# Patient Record
Sex: FEMALE | Race: WHITE | NOT HISPANIC OR LATINO | Employment: OTHER | URBAN - METROPOLITAN AREA
[De-identification: names, ages, dates, MRNs, and addresses within clinical notes are randomized per-mention and may not be internally consistent; named-entity substitution may affect disease eponyms.]

---

## 2021-07-12 ENCOUNTER — OFFICE VISIT (OUTPATIENT)
Dept: CARDIOLOGY | Facility: CLINIC | Age: 56
End: 2021-07-12

## 2021-07-12 VITALS
WEIGHT: 264 LBS | BODY MASS INDEX: 43.99 KG/M2 | HEART RATE: 67 BPM | DIASTOLIC BLOOD PRESSURE: 87 MMHG | SYSTOLIC BLOOD PRESSURE: 139 MMHG | HEIGHT: 65 IN

## 2021-07-12 DIAGNOSIS — R07.2 PRECORDIAL PAIN: Primary | ICD-10-CM

## 2021-07-12 DIAGNOSIS — E66.01 MORBID OBESITY WITH BMI OF 40.0-44.9, ADULT (HCC): ICD-10-CM

## 2021-07-12 DIAGNOSIS — R06.02 SHORTNESS OF BREATH: ICD-10-CM

## 2021-07-12 DIAGNOSIS — R94.31 ABNORMAL ELECTROCARDIOGRAM: ICD-10-CM

## 2021-07-12 PROBLEM — Z34.90: Status: ACTIVE | Noted: 2021-07-12

## 2021-07-12 PROCEDURE — 93000 ELECTROCARDIOGRAM COMPLETE: CPT | Performed by: INTERNAL MEDICINE

## 2021-07-12 PROCEDURE — 99203 OFFICE O/P NEW LOW 30 MIN: CPT | Performed by: INTERNAL MEDICINE

## 2021-07-12 RX ORDER — ESCITALOPRAM OXALATE 10 MG/1
10 TABLET ORAL DAILY
COMMUNITY
Start: 2021-05-28 | End: 2023-02-27 | Stop reason: SDUPTHER

## 2021-07-12 RX ORDER — EZETIMIBE 10 MG/1
10 TABLET ORAL DAILY
COMMUNITY
Start: 2021-04-23

## 2021-07-12 RX ORDER — MECLIZINE HCL 12.5 MG/1
12.5-25 TABLET ORAL 2 TIMES DAILY PRN
COMMUNITY
Start: 2021-06-11 | End: 2022-12-10 | Stop reason: SDUPTHER

## 2021-07-12 RX ORDER — ASPIRIN 81 MG/1
81 TABLET ORAL DAILY
COMMUNITY
End: 2022-07-06

## 2021-07-12 RX ORDER — SACCHAROMYCES BOULARDII 250 MG
250 CAPSULE ORAL DAILY
COMMUNITY
Start: 2021-04-12 | End: 2022-07-06

## 2021-07-12 RX ORDER — FLUTICASONE PROPIONATE 50 MCG
2 SPRAY, SUSPENSION (ML) NASAL DAILY
COMMUNITY
Start: 2021-06-11 | End: 2022-07-06

## 2021-07-12 RX ORDER — CETIRIZINE HYDROCHLORIDE 10 MG/1
10 TABLET ORAL EVERY EVENING
Status: ON HOLD | COMMUNITY
Start: 2021-06-11 | End: 2023-03-30

## 2021-07-12 NOTE — PROGRESS NOTES
New Patient     Ref By Dr. Anedrson  Abnormal EKG  Chest discomfort and Dizziness    Subjective:        [facility]  Cardiology Consult Note    Patient Identification:  Name: Lizy Bryan  Age: 55 y.o.  Sex: female  :  1965  MRN: [id number]             CC  CP, ANYTIME, 30 SECONDS, SOB, FATIGUE,     H/O HTN  H/O HYPERLIPIDEMIA  PRE DIABETICS      History of Present Illness:   55-year-old female here for the cardiac evaluation as well as establishment of care as the patient complaining of the retrosternal chest pains come at anytime lasting for 30 seconds associated with the shortness of breath and fatigue    Patient also known to have a history of the hypertension hyperlipidemia and patient is prediabetic    Comorbid cardiac risk factors:     Past Medical History:  Past Medical History:   Diagnosis Date   • Abnormal ECG    • COPD (chronic obstructive pulmonary disease) (CMS/HCC)    • Hyperlipidemia    • Hypertension      Past Surgical History:  History reviewed. No pertinent surgical history.   Allergies:  No Known Allergies  Home Meds:  (Not in a hospital admission)    Current Meds:   [unfilled]  Social History:   Social History     Tobacco Use   • Smoking status: Former Smoker   • Smokeless tobacco: Never Used   • Tobacco comment: quit 2013   Substance Use Topics   • Alcohol use: Never      Family History:  Family History   Problem Relation Age of Onset   • Hypertension Mother    • Hyperlipidemia Mother    • Heart attack Mother    • Hypertension Father    • Hyperlipidemia Father    • Heart attack Father         Review of Systems    Constitutional: No weakness,fatigue, fever, rigors, chills   Eyes: No vision changes, eye pain   ENT/oropharynx: No difficulty swallowing, sore throat, epistaxis, changes in hearing   Cardiovascular:  Chest pain   Respiratory: No shortness of breath, dyspnea on exertion, cough, wheezing hemoptysis   Gastrointestinal: No abdominal pain, nausea, vomiting, diarrhea,  bloody stools   Genitourinary: No hematuria, dysuria   Neurological: No headache, tremors, numbness,  one-sided weakness    Musculoskeletal: No cramps, myalgias,  joint pain, joint swelling   Integument: No rash, edema           Constitutional:  Heart Rate:  [67] 67  BP: (139)/(87) 139/87    Physical Exam   General:  Appears in no acute distress  Eyes: PERTL,  HEENT:  No JVD. Thyroid not visibly enlarged. No mucosal pallor or cyanosis  Respiratory: Respirations regular and unlabored at rest. BBS with good air entry in all fields. No crackles, rubs or wheezes auscultated  Cardiovascular: S1S2 Regular rate and rhythm. No murmur, rub or gallop auscultated. No carotid bruits. DP/PT pulses    . No pretibial pitting edema  Gastrointestinal: Abdomen soft, flat, non tender. Bowel sounds present. No hepatosplenomegaly. No ascites  Musculoskeletal: METZGER x4. No abnormal movements  Extremities: No digital clubbing or cyanosis  Skin: Color pink. Skin warm and dry to touch. No rashes  No xanthoma  Neuro: AAO x3 CN II-XII grossly intact            ECG 12 Lead    Date/Time: 7/12/2021 11:08 AM  Performed by: Jesus Hassan MD  Authorized by: Jesus Hassan MD   Comparison: not compared with previous ECG   Previous ECG: no previous ECG available  Rhythm: sinus rhythm  ST Flattening: all    Clinical impression: non-specific ECG                Cardiographics  ECG:     Telemetry:    Echocardiogram:     Imaging  Chest X-ray:     Lab Review               @[email or web address]@              Assessment:/ Recommendations / Plan:   Patient Active Problem List   Diagnosis   • Morbid obesity with BMI of 40.0-44.9, adult (CMS/formerly [facility])   • Precordial pain   • Abnormal electrocardiogram                    ICD-10-CM ICD-9-CM   1. Precordial pain  R07.2 786.51   2. Morbid obesity with BMI of 40.0-44.9, adult (CMS/formerly [facility])  E66.01 278.01    Z68.41 V85.41   3. Abnormal electrocardiogram  R94.31 794.31     1.Shortness of Breath, Fatigue  Considering the  patient's symptoms as well as clinical situation and  EKG findings, along with cardiac risk factors, ischemic workup is necessary to rule out ischemic cardiomyopathy, stress induced arrhythmias, and functional capacity for diagnosis as well as prognostic consideration    - Stress Test With Myocardial Perfusion One Day  - Adult Transthoracic Echo Complete W/ Cont if Necessary Per Protocol  - Holter Monitor - 24 Hour    2. Precordial pain  Considering patient's medical condition as well as the risk factors, patient will require echocardiogram for further evaluation for the LV function, four-chamber evaluation, including the pressures, valvular function and  pericardial disease and pericardial effusion    - Stress Test With Myocardial Perfusion One Day  - Adult Transthoracic Echo Complete W/ Cont if Necessary Per Protocol  - Holter Monitor - 24 Hour    3. Morbid obesity with BMI of 40.0-44.9, adult (CMS/[facility])  Significant risk of obesity to CAD, HTN has been explained  Advantages of wt reduction has been explained    - Stress Test With Myocardial Perfusion One Day  - Adult Transthoracic Echo Complete W/ Cont if Necessary Per Protocol  - Holter Monitor - 24 Hour    4. Abnormal electrocardiogram    - Stress Test With Myocardial Perfusion One Day  - Adult Transthoracic Echo Complete W/ Cont if Necessary Per Protocol  - Holter Monitor - 24 Hour       WALKING MITRA, ECHO, 24 HR HOLTER  DOES NOT THINK SHE CAN DO WALKING STRESS TEST    Labs/tests ordered for am      Jesus Hassan MD  7/12/2021, 11:04 EDT      EMR Dragon/Transcription:   Dictated utilizing Dragon dictation

## 2022-07-05 PROCEDURE — 99285 EMERGENCY DEPT VISIT HI MDM: CPT

## 2022-07-05 RX ORDER — SODIUM CHLORIDE 0.9 % (FLUSH) 0.9 %
10 SYRINGE (ML) INJECTION AS NEEDED
Status: DISCONTINUED | OUTPATIENT
Start: 2022-07-05 | End: 2022-07-07 | Stop reason: HOSPADM

## 2022-07-06 ENCOUNTER — HOSPITAL ENCOUNTER (OUTPATIENT)
Facility: HOSPITAL | Age: 57
Setting detail: OBSERVATION
Discharge: HOME | End: 2022-07-07
Attending: EMERGENCY MEDICINE | Admitting: HOSPITALIST

## 2022-07-06 ENCOUNTER — APPOINTMENT (OUTPATIENT)
Dept: CT IMAGING | Facility: HOSPITAL | Age: 57
End: 2022-07-06

## 2022-07-06 DIAGNOSIS — N12 PYELONEPHRITIS: Primary | ICD-10-CM

## 2022-07-06 LAB
ALBUMIN SERPL-MCNC: 3.7 G/DL (ref 3.5–5.2)
ALBUMIN/GLOB SERPL: 1.2 G/DL
ALP SERPL-CCNC: 97 U/L (ref 39–117)
ALT SERPL W P-5'-P-CCNC: 32 U/L (ref 1–33)
ANION GAP SERPL CALCULATED.3IONS-SCNC: 13 MMOL/L (ref 5–15)
AST SERPL-CCNC: 28 U/L (ref 1–32)
BACTERIA UR QL AUTO: ABNORMAL /HPF
BASOPHILS # BLD AUTO: 0.05 10*3/MM3 (ref 0–0.2)
BASOPHILS NFR BLD AUTO: 1 % (ref 0–1.5)
BILIRUB SERPL-MCNC: 0.5 MG/DL (ref 0–1.2)
BILIRUB UR QL STRIP: NEGATIVE
BUN SERPL-MCNC: 9 MG/DL (ref 6–20)
BUN/CREAT SERPL: 8.8 (ref 7–25)
CALCIUM SPEC-SCNC: 8.9 MG/DL (ref 8.6–10.5)
CHLORIDE SERPL-SCNC: 101 MMOL/L (ref 98–107)
CLARITY UR: CLEAR
CO2 SERPL-SCNC: 23 MMOL/L (ref 22–29)
COLOR UR: YELLOW
CREAT SERPL-MCNC: 1.02 MG/DL (ref 0.57–1)
D-LACTATE SERPL-SCNC: 1 MMOL/L (ref 0.5–2)
D-LACTATE SERPL-SCNC: 2.2 MMOL/L (ref 0.5–2)
DEPRECATED RDW RBC AUTO: 41.5 FL (ref 37–54)
EGFRCR SERPLBLD CKD-EPI 2021: 64.7 ML/MIN/1.73
EOSINOPHIL # BLD AUTO: 0.16 10*3/MM3 (ref 0–0.4)
EOSINOPHIL NFR BLD AUTO: 3.1 % (ref 0.3–6.2)
ERYTHROCYTE [DISTWIDTH] IN BLOOD BY AUTOMATED COUNT: 13 % (ref 12.3–15.4)
GLOBULIN UR ELPH-MCNC: 3.2 GM/DL
GLUCOSE SERPL-MCNC: 86 MG/DL (ref 65–99)
GLUCOSE UR STRIP-MCNC: NEGATIVE MG/DL
HCT VFR BLD AUTO: 39.9 % (ref 34–46.6)
HGB BLD-MCNC: 14.3 G/DL (ref 12–15.9)
HGB UR QL STRIP.AUTO: ABNORMAL
HYALINE CASTS UR QL AUTO: ABNORMAL /LPF
IMM GRANULOCYTES # BLD AUTO: 0.03 10*3/MM3 (ref 0–0.05)
IMM GRANULOCYTES NFR BLD AUTO: 0.6 % (ref 0–0.5)
KETONES UR QL STRIP: NEGATIVE
LEUKOCYTE ESTERASE UR QL STRIP.AUTO: ABNORMAL
LYMPHOCYTES # BLD AUTO: 0.79 10*3/MM3 (ref 0.7–3.1)
LYMPHOCYTES NFR BLD AUTO: 15.1 % (ref 19.6–45.3)
MCH RBC QN AUTO: 32 PG (ref 26.6–33)
MCHC RBC AUTO-ENTMCNC: 35.8 G/DL (ref 31.5–35.7)
MCV RBC AUTO: 89.3 FL (ref 79–97)
MONOCYTES # BLD AUTO: 0.43 10*3/MM3 (ref 0.1–0.9)
MONOCYTES NFR BLD AUTO: 8.2 % (ref 5–12)
NEUTROPHILS NFR BLD AUTO: 3.76 10*3/MM3 (ref 1.7–7)
NEUTROPHILS NFR BLD AUTO: 72 % (ref 42.7–76)
NITRITE UR QL STRIP: NEGATIVE
NRBC BLD AUTO-RTO: 0 /100 WBC (ref 0–0.2)
PH UR STRIP.AUTO: 6.5 [PH] (ref 5–8)
PLATELET # BLD AUTO: 205 10*3/MM3 (ref 140–450)
PMV BLD AUTO: 10.7 FL (ref 6–12)
POTASSIUM SERPL-SCNC: 3.6 MMOL/L (ref 3.5–5.2)
PROCALCITONIN SERPL-MCNC: 1.23 NG/ML (ref 0–0.25)
PROT SERPL-MCNC: 6.9 G/DL (ref 6–8.5)
PROT UR QL STRIP: ABNORMAL
RBC # BLD AUTO: 4.47 10*6/MM3 (ref 3.77–5.28)
RBC # UR STRIP: ABNORMAL /HPF
REF LAB TEST METHOD: ABNORMAL
SARS-COV-2 RNA RESP QL NAA+PROBE: NOT DETECTED
SODIUM SERPL-SCNC: 137 MMOL/L (ref 136–145)
SP GR UR STRIP: 1.01 (ref 1–1.03)
SQUAMOUS #/AREA URNS HPF: ABNORMAL /HPF
UROBILINOGEN UR QL STRIP: ABNORMAL
WBC # UR STRIP: ABNORMAL /HPF
WBC NRBC COR # BLD: 5.22 10*3/MM3 (ref 3.4–10.8)

## 2022-07-06 PROCEDURE — 36415 COLL VENOUS BLD VENIPUNCTURE: CPT

## 2022-07-06 PROCEDURE — 96375 TX/PRO/DX INJ NEW DRUG ADDON: CPT

## 2022-07-06 PROCEDURE — 84145 PROCALCITONIN (PCT): CPT | Performed by: EMERGENCY MEDICINE

## 2022-07-06 PROCEDURE — 83605 ASSAY OF LACTIC ACID: CPT | Performed by: EMERGENCY MEDICINE

## 2022-07-06 PROCEDURE — 80053 COMPREHEN METABOLIC PANEL: CPT | Performed by: EMERGENCY MEDICINE

## 2022-07-06 PROCEDURE — 81001 URINALYSIS AUTO W/SCOPE: CPT | Performed by: EMERGENCY MEDICINE

## 2022-07-06 PROCEDURE — 74176 CT ABD & PELVIS W/O CONTRAST: CPT

## 2022-07-06 PROCEDURE — 99285 EMERGENCY DEPT VISIT HI MDM: CPT

## 2022-07-06 PROCEDURE — 87040 BLOOD CULTURE FOR BACTERIA: CPT | Performed by: EMERGENCY MEDICINE

## 2022-07-06 PROCEDURE — G0378 HOSPITAL OBSERVATION PER HR: HCPCS

## 2022-07-06 PROCEDURE — 25010000002 CEFEPIME PER 500 MG: Performed by: EMERGENCY MEDICINE

## 2022-07-06 PROCEDURE — 85025 COMPLETE CBC W/AUTO DIFF WBC: CPT | Performed by: EMERGENCY MEDICINE

## 2022-07-06 PROCEDURE — 96365 THER/PROPH/DIAG IV INF INIT: CPT

## 2022-07-06 PROCEDURE — 25010000002 ONDANSETRON PER 1 MG: Performed by: EMERGENCY MEDICINE

## 2022-07-06 PROCEDURE — 25010000002 CEFTRIAXONE PER 250 MG: Performed by: HOSPITALIST

## 2022-07-06 PROCEDURE — U0003 INFECTIOUS AGENT DETECTION BY NUCLEIC ACID (DNA OR RNA); SEVERE ACUTE RESPIRATORY SYNDROME CORONAVIRUS 2 (SARS-COV-2) (CORONAVIRUS DISEASE [COVID-19]), AMPLIFIED PROBE TECHNIQUE, MAKING USE OF HIGH THROUGHPUT TECHNOLOGIES AS DESCRIBED BY CMS-2020-01-R: HCPCS | Performed by: EMERGENCY MEDICINE

## 2022-07-06 PROCEDURE — 25010000002 SODIUM CHLORIDE 0.9 % WITH KCL 20 MEQ 20-0.9 MEQ/L-% SOLUTION: Performed by: HOSPITALIST

## 2022-07-06 PROCEDURE — 25010000002 MORPHINE PER 10 MG: Performed by: EMERGENCY MEDICINE

## 2022-07-06 RX ORDER — ONDANSETRON 2 MG/ML
4 INJECTION INTRAMUSCULAR; INTRAVENOUS EVERY 6 HOURS PRN
Status: DISCONTINUED | OUTPATIENT
Start: 2022-07-06 | End: 2022-07-07

## 2022-07-06 RX ORDER — ONDANSETRON 2 MG/ML
4 INJECTION INTRAMUSCULAR; INTRAVENOUS ONCE
Status: COMPLETED | OUTPATIENT
Start: 2022-07-06 | End: 2022-07-06

## 2022-07-06 RX ORDER — HYDROMORPHONE HYDROCHLORIDE 1 MG/ML
0.5 INJECTION, SOLUTION INTRAMUSCULAR; INTRAVENOUS; SUBCUTANEOUS 4 TIMES DAILY PRN
Status: DISCONTINUED | OUTPATIENT
Start: 2022-07-06 | End: 2022-07-07

## 2022-07-06 RX ORDER — ACETAMINOPHEN 500 MG
1000 TABLET ORAL ONCE
Status: COMPLETED | OUTPATIENT
Start: 2022-07-06 | End: 2022-07-06

## 2022-07-06 RX ORDER — SPIRONOLACTONE 25 MG/1
50 TABLET ORAL DAILY
COMMUNITY
End: 2023-01-11 | Stop reason: SDUPTHER

## 2022-07-06 RX ORDER — CETIRIZINE HYDROCHLORIDE 10 MG/1
10 TABLET ORAL EVERY EVENING
Status: DISCONTINUED | OUTPATIENT
Start: 2022-07-06 | End: 2022-07-06

## 2022-07-06 RX ORDER — MORPHINE SULFATE 2 MG/ML
2 INJECTION, SOLUTION INTRAMUSCULAR; INTRAVENOUS ONCE
Status: DISCONTINUED | OUTPATIENT
Start: 2022-07-06 | End: 2022-07-06

## 2022-07-06 RX ORDER — ALBUTEROL SULFATE 90 UG/1
2 AEROSOL, METERED RESPIRATORY (INHALATION) EVERY 4 HOURS PRN
COMMUNITY
End: 2022-12-10 | Stop reason: SDUPTHER

## 2022-07-06 RX ORDER — TRAZODONE HYDROCHLORIDE 150 MG/1
150 TABLET ORAL NIGHTLY
COMMUNITY

## 2022-07-06 RX ORDER — CETIRIZINE HYDROCHLORIDE 10 MG/1
10 TABLET ORAL NIGHTLY
Status: DISCONTINUED | OUTPATIENT
Start: 2022-07-06 | End: 2022-07-07 | Stop reason: HOSPADM

## 2022-07-06 RX ORDER — SODIUM CHLORIDE AND POTASSIUM CHLORIDE 150; 900 MG/100ML; MG/100ML
75 INJECTION, SOLUTION INTRAVENOUS CONTINUOUS
Status: DISCONTINUED | OUTPATIENT
Start: 2022-07-06 | End: 2022-07-07

## 2022-07-06 RX ORDER — ALBUTEROL SULFATE 2.5 MG/3ML
2.5 SOLUTION RESPIRATORY (INHALATION) EVERY 4 HOURS PRN
Status: DISCONTINUED | OUTPATIENT
Start: 2022-07-06 | End: 2022-07-07

## 2022-07-06 RX ORDER — ESCITALOPRAM OXALATE 10 MG/1
10 TABLET ORAL DAILY
Status: DISCONTINUED | OUTPATIENT
Start: 2022-07-06 | End: 2022-07-07 | Stop reason: HOSPADM

## 2022-07-06 RX ORDER — MORPHINE SULFATE 2 MG/ML
2 INJECTION, SOLUTION INTRAMUSCULAR; INTRAVENOUS ONCE
Status: COMPLETED | OUTPATIENT
Start: 2022-07-06 | End: 2022-07-06

## 2022-07-06 RX ORDER — ONDANSETRON 2 MG/ML
4 INJECTION INTRAMUSCULAR; INTRAVENOUS ONCE
Status: DISCONTINUED | OUTPATIENT
Start: 2022-07-06 | End: 2022-07-06

## 2022-07-06 RX ORDER — PANTOPRAZOLE SODIUM 40 MG/1
40 TABLET, DELAYED RELEASE ORAL
Status: DISCONTINUED | OUTPATIENT
Start: 2022-07-07 | End: 2022-07-07 | Stop reason: HOSPADM

## 2022-07-06 RX ORDER — ACETAMINOPHEN 500 MG
1000 TABLET ORAL ONCE
Status: DISCONTINUED | OUTPATIENT
Start: 2022-07-06 | End: 2022-07-06

## 2022-07-06 RX ADMIN — MORPHINE SULFATE 2 MG: 2 INJECTION, SOLUTION INTRAMUSCULAR; INTRAVENOUS at 02:55

## 2022-07-06 RX ADMIN — ONDANSETRON 4 MG: 2 INJECTION INTRAMUSCULAR; INTRAVENOUS at 02:54

## 2022-07-06 RX ADMIN — CEFTRIAXONE 1 G: 1 INJECTION, POWDER, FOR SOLUTION INTRAMUSCULAR; INTRAVENOUS at 15:56

## 2022-07-06 RX ADMIN — SODIUM CHLORIDE 1000 ML: 9 INJECTION, SOLUTION INTRAVENOUS at 02:53

## 2022-07-06 RX ADMIN — TRAZODONE HYDROCHLORIDE 150 MG: 100 TABLET ORAL at 19:57

## 2022-07-06 RX ADMIN — CETIRIZINE HYDROCHLORIDE 10 MG: 10 TABLET ORAL at 19:58

## 2022-07-06 RX ADMIN — ACETAMINOPHEN 1000 MG: 500 TABLET ORAL at 08:55

## 2022-07-06 RX ADMIN — ESCITALOPRAM 10 MG: 10 TABLET, FILM COATED ORAL at 15:55

## 2022-07-06 RX ADMIN — SODIUM CHLORIDE AND POTASSIUM CHLORIDE 75 ML/HR: .9; .15 SOLUTION INTRAVENOUS at 13:49

## 2022-07-06 RX ADMIN — CEFEPIME HYDROCHLORIDE 2 G: 2 INJECTION, POWDER, FOR SOLUTION INTRAVENOUS at 02:58

## 2022-07-06 NOTE — PLAN OF CARE
Goal Outcome Evaluation:  VSS, pt feels like she may have been febrile today but only recorded temps in 99 range.  RA and CPAP at night, pt has not had any abdominal or flank pain this shift. Ambulating to bathroom with standby assist. IVF in PIV. Pt concerned about 2-3+ pitting edema in lower extremities, MD made aware of edema and home aldactone and attending MD suggested patient elevate legs. CTM.

## 2022-07-06 NOTE — PROGRESS NOTES
Clinical Pharmacy Services: Medication History    Lizy Bryan is a 56 y.o. female presenting to [facility] for   Chief Complaint   Patient presents with   • Urinary Tract Infection       She  has a past medical history of Abnormal ECG, COPD (chronic obstructive pulmonary disease) (CMS/[facility]), Hyperlipidemia, and Hypertension.    Allergies as of 07/05/2022   • (No Known Allergies)       Medication information was obtained from: Patient   Pharmacy and Phone Number:     Prior to Admission Medications     Prescriptions Last Dose Informant Patient Reported? Taking?    albuterol sulfate  (90 Base) MCG/ACT inhaler 7/6/2022 Self Yes Yes    Inhale 2 puffs Every 4 (Four) Hours As Needed for Wheezing.    escitalopram (LEXAPRO) 10 MG tablet Past Week Self Yes Yes    Take 10 mg by mouth Daily.    ezetimibe (ZETIA) 10 MG tablet Past Week Self Yes Yes    Take 10 mg by mouth Daily.    spironolactone (ALDACTONE) 25 MG tablet Past Week Self Yes Yes    Take 25 mg by mouth Daily.    traZODone (DESYREL) 150 MG tablet Past Week Self Yes Yes    Take 150 mg by mouth Every Night.    cetirizine (zyrTEC) 10 MG tablet More than a month Self Yes No    Take 10 mg by mouth Every Evening.    meclizine (ANTIVERT) 12.5 MG tablet More than a month Self Yes No    Take 12.5-25 mg by mouth 2 (Two) Times a Day As Needed.            Medication notes: Patient states she is prescribed lisinopril 10 mg that she takes daily, however she has had low blood pressure since becoming sick and was advised by her doctor to hold off on taking lisinopril until she is better.    This medication list is complete to the best of my knowledge as of 7/6/2022    Please call if questions.    Kevin Garcia  Medication History Technician  [phone]    7/6/2022 09:21 EDT

## 2022-07-06 NOTE — ED PROVIDER NOTES
EMERGENCY DEPARTMENT ENCOUNTER    Room Number:  16/16  Date of encounter:  7/6/2022  PCP: Larissa Anderson MD  Historian: Patient      HPI:  Chief Complaint: Right flank pain, fevers chills  A complete HPI/ROS/PMH/PSH/SH/FH are unobtainable due to: None    Context: Lizy Bryan is a 56 y.o. female who presents to the ED c/o left flank pain fevers chills for the past several days.  Patient states she recently was diagnosed with 3 mm kidney stone which she felt passed.  Patient states that since then she has been having some intermittent chills and nausea.  Was seen in the ED and apparently released but then told she should come back for admission.  Patient reports occasional fevers and chills.  Also reports nausea vomiting.      PAST MEDICAL HISTORY  Active Ambulatory Problems     Diagnosis Date Noted   • Morbid obesity with BMI of 40.0-44.9, adult ([facility]) 07/12/2021   • Precordial pain 07/12/2021   • Abnormal electrocardiogram 07/12/2021     Resolved Ambulatory Problems     Diagnosis Date Noted   • No Resolved Ambulatory Problems     Past Medical History:   Diagnosis Date   • Abnormal ECG    • COPD (chronic obstructive pulmonary disease) (CMS/[facility])    • Hyperlipidemia    • Hypertension          PAST SURGICAL HISTORY  No past surgical history on file.      FAMILY HISTORY  Family History   Problem Relation Age of Onset   • Hypertension Mother    • Hyperlipidemia Mother    • Heart attack Mother    • Hypertension Father    • Hyperlipidemia Father    • Heart attack Father          SOCIAL HISTORY  Social History     Socioeconomic History   • Marital status:    Tobacco Use   • Smoking status: Former Smoker   • Smokeless tobacco: Never Used   • Tobacco comment: quit 2013   Vaping Use   • Vaping Use: Every day   • Substances: Nicotine   • Devices: Pre-filled or refillable cartridge   • Passive vaping exposure: Yes   Substance and Sexual Activity   • Alcohol use: Never   • Drug use: Never   • Sexual activity: Not  Currently         ALLERGIES  Patient has no known allergies.        REVIEW OF SYSTEMS  Review of Systems     All systems reviewed and negative except for those discussed in HPI.       PHYSICAL EXAM    I have reviewed the triage vital signs and nursing notes.    ED Triage Vitals [07/05/22 2121]   Temp Heart Rate Resp BP SpO2   97.9 °F (36.6 °C) 85 18 130/81 97 %      Temp src Heart Rate Source Patient Position BP Location FiO2 (%)   Tympanic Monitor Standing Left arm --       Physical Exam  GENERAL: Heart distressed  HENT: nares patent  EYES: no scleral icterus  CV: regular rhythm, regular rate  RESPIRATORY: normal effort clear to station bilaterally  ABDOMEN: soft, left flank tenderness  MUSCULOSKELETAL: no deformity  NEURO: alert, moves all extremities, follows commands  SKIN: warm, dry        LAB RESULTS  Recent Results (from the past 24 hour(s))   Comprehensive Metabolic Panel    Collection Time: 07/06/22  2:49 AM    Specimen: Blood   Result Value Ref Range    Glucose 86 65 - 99 mg/dL    BUN 9 6 - 20 mg/dL    Creatinine 1.02 (H) 0.57 - 1.00 mg/dL    Sodium 137 136 - 145 mmol/L    Potassium 3.6 3.5 - 5.2 mmol/L    Chloride 101 98 - 107 mmol/L    CO2 23.0 22.0 - 29.0 mmol/L    Calcium 8.9 8.6 - 10.5 mg/dL    Total Protein 6.9 6.0 - 8.5 g/dL    Albumin 3.70 3.50 - 5.20 g/dL    ALT (SGPT) 32 1 - 33 U/L    AST (SGOT) 28 1 - 32 U/L    Alkaline Phosphatase 97 39 - 117 U/L    Total Bilirubin 0.5 0.0 - 1.2 mg/dL    Globulin 3.2 gm/dL    A/G Ratio 1.2 g/dL    BUN/Creatinine Ratio 8.8 7.0 - 25.0    Anion Gap 13.0 5.0 - 15.0 mmol/L    eGFR 64.7 >60.0 mL/min/1.73   Procalcitonin    Collection Time: 07/06/22  2:49 AM    Specimen: Blood   Result Value Ref Range    Procalcitonin 1.23 (H) 0.00 - 0.25 ng/mL   Lactic Acid, Plasma    Collection Time: 07/06/22  2:49 AM    Specimen: Blood   Result Value Ref Range    Lactate 2.2 (C) 0.5 - 2.0 mmol/L   CBC Auto Differential    Collection Time: 07/06/22  2:49 AM    Specimen: Blood    Result Value Ref Range    WBC 5.22 3.40 - 10.80 10*3/mm3    RBC 4.47 3.77 - 5.28 10*6/mm3    Hemoglobin 14.3 12.0 - 15.9 g/dL    Hematocrit 39.9 34.0 - 46.6 %    MCV 89.3 79.0 - 97.0 fL    MCH 32.0 26.6 - 33.0 pg    MCHC 35.8 (H) 31.5 - 35.7 g/dL    RDW 13.0 12.3 - 15.4 %    RDW-SD 41.5 37.0 - 54.0 fl    MPV 10.7 6.0 - 12.0 fL    Platelets 205 140 - 450 10*3/mm3    Neutrophil % 72.0 42.7 - 76.0 %    Lymphocyte % 15.1 (L) 19.6 - 45.3 %    Monocyte % 8.2 5.0 - 12.0 %    Eosinophil % 3.1 0.3 - 6.2 %    Basophil % 1.0 0.0 - 1.5 %    Immature Grans % 0.6 (H) 0.0 - 0.5 %    Neutrophils, Absolute 3.76 1.70 - 7.00 10*3/mm3    Lymphocytes, Absolute 0.79 0.70 - 3.10 10*3/mm3    Monocytes, Absolute 0.43 0.10 - 0.90 10*3/mm3    Eosinophils, Absolute 0.16 0.00 - 0.40 10*3/mm3    Basophils, Absolute 0.05 0.00 - 0.20 10*3/mm3    Immature Grans, Absolute 0.03 0.00 - 0.05 10*3/mm3    nRBC 0.0 0.0 - 0.2 /100 WBC   COVID-19, ROBERT IN-HOUSE CEPHEID/NATHAN NP SWAB IN TRANSPORT MEDIA 8-12 HR TAT - Swab, Nasopharynx    Collection Time: 07/06/22  3:27 AM    Specimen: Nasopharynx; Swab   Result Value Ref Range    COVID19 Not Detected Not Detected - Ref. Range   Urinalysis With Microscopic If Indicated (No Culture) - Urine, Clean Catch    Collection Time: 07/06/22  3:36 AM    Specimen: Urine, Clean Catch   Result Value Ref Range    Color, UA Yellow Yellow, Straw    Appearance, UA Clear Clear    pH, UA 6.5 5.0 - 8.0    Specific Gravity, UA 1.010 1.005 - 1.030    Glucose, UA Negative Negative    Ketones, UA Negative Negative    Bilirubin, UA Negative Negative    Blood, UA Trace (A) Negative    Protein, UA 30 mg/dL (1+) (A) Negative    Leuk Esterase, UA Trace (A) Negative    Nitrite, UA Negative Negative    Urobilinogen, UA 1.0 E.U./dL 0.2 - 1.0 E.U./dL   Urinalysis, Microscopic Only - Urine, Clean Catch    Collection Time: 07/06/22  3:36 AM    Specimen: Urine, Clean Catch   Result Value Ref Range    RBC, UA 3-5 (A) None Seen, 0-2 /HPF    WBC,  UA 3-5 (A) None Seen, 0-2 /HPF    Bacteria, UA None Seen None Seen /HPF    Squamous Epithelial Cells, UA 0-2 None Seen, 0-2 /HPF    Hyaline Casts, UA 0-2 None Seen /LPF    Methodology Automated Microscopy    STAT Lactic Acid, Reflex    Collection Time: 07/06/22  6:53 AM    Specimen: Blood   Result Value Ref Range    Lactate 1.0 0.5 - 2.0 mmol/L       Ordered the above labs and independently reviewed the results.        RADIOLOGY  CT Abdomen Pelvis Without Contrast    Result Date: 7/6/2022  Patient: YVON BAKER  Time Out: 05:39 Exam(s): CT ABDOMEN + PELVIS Without Contrast EXAM:   CT Abdomen and Pelvis Without Intravenous Contrast CLINICAL HISTORY:    Reason for exam: right flank pain, fevers, hx of kidney stone. TECHNIQUE:   Axial computed tomography images of the abdomen and pelvis without intravenous contrast.  CTDI is 34.5 mGy and DLP is 1708.5 mGy-cm.  This CT exam was performed according to the principle of ALARA (As Low As Reasonably Achievable) by using one or more of the following dose reduction techniques: automated exposure control, adjustment of the mA and or kV according to patient size, and or use of iterative reconstruction technique. COMPARISON:   None FINDINGS:   Lung bases:  Unremarkable.  No mass.  No consolidation.  ABDOMEN:   Liver:  Hepatomegaly.   Gallbladder and bile ducts:  Unremarkable.  No calcified stones.  No ductal dilation.   Pancreas:  Unremarkable.  No ductal dilation.   Spleen:  Borderline size of the spleen.   Adrenals:  Unremarkable.  No mass.   Kidneys and ureters:  Punctate nonobstructing right renal stones.  No hydronephrosis or definite ureteral stone.  Nonspecific left perinephric fat stranding.  Minimal fullness of the left renal collecting system.  Infection or recently passed stone would be difficult to exclude.   Stomach and bowel:  Evaluation of the stomach is limited by underdistention.  No small bowel obstruction or inflammation.  No mucosal thickening.  PELVIS:    Appendix:  Normal appendix.   Bladder:  Unremarkable.  No stones.   Reproductive:  Unremarkable as visualized.  ABDOMEN and PELVIS:   Intraperitoneal space:  Unremarkable.  No free air.  No significant fluid collection.   Bones joints:  Mild degenerative changes of the spine.  No acute fracture.  No dislocation.   Soft tissues:  Small fat-containing umbilical hernia.   Vasculature:  Atherosclerotic changes of the vasculature.  No aortic aneurysm.   Lymph nodes:  Unremarkable.  No enlarged lymph nodes. IMPRESSION:     1.  Punctate nonobstructing right renal stones.  No hydronephrosis or definite ureteral stone. 2.  Nonspecific left perinephric fat stranding.  Minimal fullness of the left renal collecting system.  Infection or recently passed stone would be difficult to exclude.     Electronically signed by Deon Green M.D. on 07-06-22 at 0539      I ordered the above noted radiological studies. Reviewed by me and discussed with radiologist.  See dictation for official radiology interpretation.      PROCEDURES    Procedures      MEDICATIONS GIVEN IN ER    Medications   sodium chloride 0.9 % flush 10 mL (has no administration in time range)   sodium chloride 0.9 % bolus 1,000 mL (0 mL Intravenous Stopped 7/6/22 0653)   cefepime 2 gm IVPB in 100 ml NS (VTB) (0 g Intravenous Stopped 7/6/22 0341)   morphine injection 2 mg (2 mg Intravenous Given 7/6/22 0255)   ondansetron (ZOFRAN) injection 4 mg (4 mg Intravenous Given 7/6/22 0254)         PROGRESS, DATA ANALYSIS, CONSULTS, AND MEDICAL DECISION MAKING    All labs have been independently reviewed by me.  All radiology studies have been reviewed by me and discussed with radiologist dictating the report.   EKG's independently viewed and interpreted by me.  Discussion below represents my analysis of pertinent findings related to patient's condition, differential diagnosis, treatment plan and final disposition.                 PPE: The patient wore a surgical mask throughout  the entire patient encounter. I wore an N95.    AS OF 07:47 EDT VITALS:    BP - 115/73  HR - 73  TEMP - 99.4 °F (37.4 °C) (Tympanic)  O2 SATS - 99%        DIAGNOSIS  Final diagnoses:   Pyelonephritis         DISPOSITION  Admit           Luís Munguia MD  07/06/22 0762

## 2022-07-06 NOTE — ED NOTES
"Pt ambulatory to triage from home - states seen at [facility] twice, told she needs to be admitted for iv antibiotics for a \"blood infection\" from her uti's that are not responding to bactrim.  Pt also states covid positive early June.  Pt wearing mask in triage. Triage personnel wore appropriate PPE    "

## 2022-07-06 NOTE — H&P
"History and physical    Primary care physician      Chief complaint  Fever chills  Left flank pain  Nausea    History of present illness  56-year-old white female with history of COPD morbidly obese hypertension hyperlipidemia and kidney stones in the past presented to [facility] emergency room with left flank pain fever chills nausea for last few days.  Patient has been evaluated and found to have 3 mm left ureter stone but no symptoms develop and she came back to the ER and work-up revealed acute left pyelonephritis with sepsis admit for management.  Patient apparently passed a kidney stone and feeling better.  Patient denies any chest pain vomiting diarrhea.    PAST MEDICAL HISTORY  • Morbidly obese     • COPD      • Hyperlipidemia     • Hypertension  History of kidney stones        PAST SURGICAL HISTORY     No past surgical history on file.     FAMILY HISTORY           Problem Relation Age of Onset   • Hypertension Mother     • Hyperlipidemia Mother     • Heart attack Mother     • Hypertension Father     • Hyperlipidemia Father     • Heart attack Father        SOCIAL HISTORY                Socioeconomic History   • Marital status:    Tobacco Use   • Smoking status: Former Smoker   • Smokeless tobacco: Never Used   • Tobacco comment: quit 2013   Vaping Use   • Vaping Use: Every day   • Substances: Nicotine   • Devices: Pre-filled or refillable cartridge   • Passive vaping exposure: Yes   Substance and Sexual Activity   • Alcohol use: Never   • Drug use: Never   • Sexual activity: Not Currently         ALLERGIES  Patient has no known allergies.  Home medications reviewed     REVIEW OF SYSTEMS  All systems reviewed and negative except for those discussed in HPI.      PHYSICAL EXAM  Blood pressure 120/70, pulse 78, temperature 97.9 °F (36.6 °C), temperature source Oral, resp. rate 20, height 162.6 cm (64\"), weight 113 kg (249 lb 12.8 oz), SpO2 97 %.    GENERAL: Heart distressed  HENT: melany " patent  EYES: no scleral icterus  CV: regular rhythm, regular rate  RESPIRATORY: normal effort clear to station bilaterally  ABDOMEN: soft, left flank tenderness nondistended bowel sounds positive  MUSCULOSKELETAL: no deformity  NEURO: alert, moves all extremities, follows commands  SKIN: warm, dry     LAB RESULTS  Lab Results (last 24 hours)     Procedure Component Value Units Date/Time    STAT Lactic Acid, Reflex [[id number]]  (Normal) Collected: 07/06/22 0653    Specimen: Blood Updated: 07/06/22 0732     Lactate 1.0 mmol/L     COVID PRE-OP / PRE-PROCEDURE SCREENING ORDER (NO ISOLATION) - Swab, Nasopharynx [[id number]]  (Normal) Collected: 07/06/22 0327    Specimen: Swab from Nasopharynx Updated: 07/06/22 0412    Narrative:      The following orders were created for panel order COVID PRE-OP / PRE-PROCEDURE SCREENING ORDER (NO ISOLATION) - Swab, Nasopharynx.  Procedure                               Abnormality         Status                     ---------                               -----------         ------                     COVID-19,[facility]-HOUSE...[[id number]]  Normal              Final result                 Please view results for these tests on the individual orders.    COVID-19,[facility]HOU[facility] CEPHEID/NATHAN NP SWAB IN TRANSPORT MEDIA 8-12 HR TAT - Swab, Nasopharynx [[id number]]  (Normal) Collected: 07/06/22 0327    Specimen: Swab from Nasopharynx Updated: 07/06/22 0412     COVID19 Not Detected    Narrative:      Fact sheet for providers: https://www.fda.gov/media/413606/download     Fact sheet for patients: https://www.fda.gov/media/522120/download    Urinalysis With Microscopic If Indicated (No Culture) - Urine, Clean Catch [[id number]]  (Abnormal) Collected: 07/06/22 0336    Specimen: Urine, Clean Catch Updated: 07/06/22 0348     Color, UA Yellow     Appearance, UA Clear     pH, UA 6.5     Specific Gravity, UA 1.010     Glucose, UA Negative     Ketones, UA Negative     Bilirubin, UA Negative     Blood, UA  "Trace     Protein, UA 30 mg/dL (1+)     Leuk Esterase, UA Trace     Nitrite, UA Negative     Urobilinogen, UA 1.0 E.U./dL    Urinalysis, Microscopic Only - Urine, Clean Catch [[id number]]  (Abnormal) Collected: 07/06/22 0336    Specimen: Urine, Clean Catch Updated: 07/06/22 0348     RBC, UA 3-5 /HPF      WBC, UA 3-5 /HPF      Bacteria, UA None Seen /HPF      Squamous Epithelial Cells, UA 0-2 /HPF      Hyaline Casts, UA 0-2 /LPF      Methodology Automated Microscopy    Lactic Acid, Plasma [[id number]]  (Abnormal) Collected: 07/06/22 0249    Specimen: Blood Updated: 07/06/22 0334     Lactate 2.2 mmol/L     Procalcitonin [[id number]]  (Abnormal) Collected: 07/06/22 0249    Specimen: Blood Updated: 07/06/22 0327     Procalcitonin 1.23 ng/mL     Narrative:      As a Marker for Sepsis (Non-Neonates):    1. <0.5 ng/mL represents a low risk of severe sepsis and/or septic shock.  2. >2 ng/mL represents a high risk of severe sepsis and/or septic shock.    As a Marker for Lower Respiratory Tract Infections that require antibiotic therapy:    PCT on Admission    Antibiotic Therapy       6-12 Hrs later    >0.5                Strongly Recommended  >0.25 - <0.5        Recommended   0.1 - 0.25          Discouraged              Remeasure/reassess PCT  <0.1                Strongly Discouraged     Remeasure/reassess PCT    As 28 day mortality risk marker: \"Change in Procalcitonin Result\" (>80% or <=80%) if Day 0 (or Day 1) and Day 4 values are available. Refer to http://www.[facility]-pct-calculator.com    Change in PCT <=80%  A decrease of PCT levels below or equal to 80% defines a positive change in PCT test result representing a higher risk for 28-day all-cause mortality of patients diagnosed with severe sepsis for septic shock.    Change in PCT >80%  A decrease of PCT levels of more than 80% defines a negative change in PCT result representing a lower risk for 28-day all-cause mortality of patients diagnosed with severe sepsis or septic " shock.       Comprehensive Metabolic Panel [[id number]]  (Abnormal) Collected: 07/06/22 0249    Specimen: Blood Updated: 07/06/22 0320     Glucose 86 mg/dL      BUN 9 mg/dL      Creatinine 1.02 mg/dL      Sodium 137 mmol/L      Potassium 3.6 mmol/L      Comment: Slight hemolysis detected by analyzer. Results may be affected.        Chloride 101 mmol/L      CO2 23.0 mmol/L      Calcium 8.9 mg/dL      Total Protein 6.9 g/dL      Albumin 3.70 g/dL      ALT (SGPT) 32 U/L      AST (SGOT) 28 U/L      Alkaline Phosphatase 97 U/L      Total Bilirubin 0.5 mg/dL      Globulin 3.2 gm/dL      A/G Ratio 1.2 g/dL      BUN/Creatinine Ratio 8.8     Anion Gap 13.0 mmol/L      eGFR 64.7 mL/min/1.73      Comment: National Kidney Foundation and American Society of Nephrology (ASN) Task Force recommended calculation based on the Chronic Kidney Disease Epidemiology Collaboration (CKD-EPI) equation refit without adjustment for race.       Narrative:      GFR Normal >60  Chronic Kidney Disease <60  Kidney Failure <15      CBC & Differential [[id number]]  (Abnormal) Collected: 07/06/22 0249    Specimen: Blood Updated: 07/06/22 0306    Narrative:      The following orders were created for panel order CBC & Differential.  Procedure                               Abnormality         Status                     ---------                               -----------         ------                     CBC Auto Differential[[id number]]        Abnormal            Final result                 Please view results for these tests on the individual orders.    CBC Auto Differential [[id number]]  (Abnormal) Collected: 07/06/22 0249    Specimen: Blood Updated: 07/06/22 0306     WBC 5.22 10*3/mm3      RBC 4.47 10*6/mm3      Hemoglobin 14.3 g/dL      Hematocrit 39.9 %      MCV 89.3 fL      MCH 32.0 pg      MCHC 35.8 g/dL      RDW 13.0 %      RDW-SD 41.5 fl      MPV 10.7 fL      Platelets 205 10*3/mm3      Neutrophil % 72.0 %      Lymphocyte % 15.1 %      Monocyte %  8.2 %      Eosinophil % 3.1 %      Basophil % 1.0 %      Immature Grans % 0.6 %      Neutrophils, Absolute 3.76 10*3/mm3      Lymphocytes, Absolute 0.79 10*3/mm3      Monocytes, Absolute 0.43 10*3/mm3      Eosinophils, Absolute 0.16 10*3/mm3      Basophils, Absolute 0.05 10*3/mm3      Immature Grans, Absolute 0.03 10*3/mm3      nRBC 0.0 /100 WBC     Blood Culture - Blood, Arm, Left [[id number]] Collected: 07/06/22 0258    Specimen: Blood from Arm, Left Updated: 07/06/22 0305    Blood Culture - Blood, Arm, Left [[id number]] Collected: 07/06/22 0249    Specimen: Blood from Arm, Left Updated: 07/06/22 0258        Imaging Results (Last 24 Hours)     Procedure Component Value Units Date/Time    CT Abdomen Pelvis Without Contrast [[id number]] Collected: 07/06/22 0539     Updated: 07/06/22 0539    Narrative:        Patient: YVON BAKER  Time Out: 05:39  Exam(s): CT ABDOMEN + PELVIS Without Contrast     EXAM:    CT Abdomen and Pelvis Without Intravenous Contrast    CLINICAL HISTORY:     Reason for exam: right flank pain, fevers, hx of kidney stone.    TECHNIQUE:    Axial computed tomography images of the abdomen and pelvis without   intravenous contrast.  CTDI is 34.5 mGy and DLP is 1708.5 mGy-cm.  This   CT exam was performed according to the principle of ALARA (As Low As   Reasonably Achievable) by using one or more of the following dose   reduction techniques: automated exposure control, adjustment of the mA   and or kV according to patient size, and or use of iterative   reconstruction technique.    COMPARISON:    None    FINDINGS:    Lung bases:  Unremarkable.  No mass.  No consolidation.     ABDOMEN:    Liver:  Hepatomegaly.    Gallbladder and bile ducts:  Unremarkable.  No calcified stones.  No   ductal dilation.    Pancreas:  Unremarkable.  No ductal dilation.    Spleen:  Borderline size of the spleen.    Adrenals:  Unremarkable.  No mass.    Kidneys and ureters:  Punctate nonobstructing right renal stones.  No    hydronephrosis or definite ureteral stone.  Nonspecific left perinephric   fat stranding.  Minimal fullness of the left renal collecting system.    Infection or recently passed stone would be difficult to exclude.    Stomach and bowel:  Evaluation of the stomach is limited by   underdistention.  No small bowel obstruction or inflammation.  No mucosal   thickening.     PELVIS:    Appendix:  Normal appendix.    Bladder:  Unremarkable.  No stones.    Reproductive:  Unremarkable as visualized.     ABDOMEN and PELVIS:    Intraperitoneal space:  Unremarkable.  No free air.  No significant   fluid collection.    Bones joints:  Mild degenerative changes of the spine.  No acute   fracture.  No dislocation.    Soft tissues:  Small fat-containing umbilical hernia.    Vasculature:  Atherosclerotic changes of the vasculature.  No aortic   aneurysm.    Lymph nodes:  Unremarkable.  No enlarged lymph nodes.    IMPRESSION:       1.  Punctate nonobstructing right renal stones.  No hydronephrosis or   definite ureteral stone.  2.  Nonspecific left perinephric fat stranding.  Minimal fullness of the   left renal collecting system.  Infection or recently passed stone would   be difficult to exclude.      Impression:          Electronically signed by Deon Green M.D. on 07-06-22 at 0539          Current Facility-Administered Medications:   •  albuterol (PROVENTIL) nebulizer solution 0.083% 2.5 mg/3mL, 2.5 mg, Nebulization, Q4H PRN, Vikram Yan MD  •  cefTRIAXone (ROCEPHIN) 1 g in sodium chloride 0.9 % 100 mL IVPB-VTB, 1 g, Intravenous, Q24H, Vikram Yan MD  •  cetirizine (zyrTEC) tablet 10 mg, 10 mg, Oral, Q PM, Vikram Yan MD  •  escitalopram (LEXAPRO) tablet 10 mg, 10 mg, Oral, Daily, Vikram Yan MD  •  HYDROmorphone (DILAUDID) injection 0.5 mg, 0.5 mg, Intravenous, 4x Daily PRN, Vikram Yan MD  •  ondansetron (ZOFRAN) injection 4 mg, 4 mg, Intravenous, Q6H PRN, Vikram Yan MD  •  [START ON 7/7/2022] pantoprazole  (PROTONIX) EC tablet 40 mg, 40 mg, Oral, Q AM, Alejandra Yan MD  •  [COMPLETED] Insert peripheral IV, , , Once **AND** sodium chloride 0.9 % flush 10 mL, 10 mL, Intravenous, PRN, Luís Munguia MD  •  sodium chloride 0.9 % with KCl 20 mEq/L infusion, 75 mL/hr, Intravenous, Continuous, Alejandra Yan MD  •  traZODone (DESYREL) tablet 150 mg, 150 mg, Oral, Nightly, Alejandra Yan MD     ASSESSMENT  Acute left pyelonephritis with sepsis  Kidney stones  Morbidly obese  COPD  Depression  Gastroesophageal reflux disease    PLAN  Admit  IVF  IV antibiotics  Urology consult  Symptomatic treatment for pain nausea and fever  Adjust home medications  Stress ulcer DVT prophylaxis  Supportive care  Patient is full code  Discussed with family nursing staff  Follow closely further recommendation according to hospital course    ALEJANDRA YAN MD

## 2022-07-07 VITALS
DIASTOLIC BLOOD PRESSURE: 66 MMHG | HEART RATE: 60 BPM | OXYGEN SATURATION: 96 % | HEIGHT: 64 IN | RESPIRATION RATE: 20 BRPM | TEMPERATURE: 98.4 F | SYSTOLIC BLOOD PRESSURE: 124 MMHG | BODY MASS INDEX: 42.65 KG/M2 | WEIGHT: 249.8 LBS

## 2022-07-07 LAB
ALBUMIN SERPL-MCNC: 3.2 G/DL (ref 3.5–5.2)
ALBUMIN/GLOB SERPL: 1.1 G/DL
ALP SERPL-CCNC: 82 U/L (ref 39–117)
ALT SERPL W P-5'-P-CCNC: 31 U/L (ref 1–33)
ANION GAP SERPL CALCULATED.3IONS-SCNC: 10 MMOL/L (ref 5–15)
AST SERPL-CCNC: 20 U/L (ref 1–32)
BASOPHILS # BLD AUTO: 0.05 10*3/MM3 (ref 0–0.2)
BASOPHILS NFR BLD AUTO: 1 % (ref 0–1.5)
BILIRUB SERPL-MCNC: 0.4 MG/DL (ref 0–1.2)
BUN SERPL-MCNC: 8 MG/DL (ref 6–20)
BUN/CREAT SERPL: 9.5 (ref 7–25)
CALCIUM SPEC-SCNC: 8.7 MG/DL (ref 8.6–10.5)
CHLORIDE SERPL-SCNC: 106 MMOL/L (ref 98–107)
CHOLEST SERPL-MCNC: 115 MG/DL (ref 0–200)
CO2 SERPL-SCNC: 22 MMOL/L (ref 22–29)
CREAT SERPL-MCNC: 0.84 MG/DL (ref 0.57–1)
DEPRECATED RDW RBC AUTO: 45.3 FL (ref 37–54)
EGFRCR SERPLBLD CKD-EPI 2021: 81.7 ML/MIN/1.73
EOSINOPHIL # BLD AUTO: 0.17 10*3/MM3 (ref 0–0.4)
EOSINOPHIL NFR BLD AUTO: 3.4 % (ref 0.3–6.2)
ERYTHROCYTE [DISTWIDTH] IN BLOOD BY AUTOMATED COUNT: 13.3 % (ref 12.3–15.4)
GLOBULIN UR ELPH-MCNC: 2.9 GM/DL
GLUCOSE SERPL-MCNC: 119 MG/DL (ref 65–99)
HBA1C MFR BLD: 5.5 % (ref 4.8–5.6)
HCT VFR BLD AUTO: 37.6 % (ref 34–46.6)
HDLC SERPL-MCNC: 18 MG/DL (ref 40–60)
HGB BLD-MCNC: 12.5 G/DL (ref 12–15.9)
IMM GRANULOCYTES # BLD AUTO: 0.06 10*3/MM3 (ref 0–0.05)
IMM GRANULOCYTES NFR BLD AUTO: 1.2 % (ref 0–0.5)
LDLC SERPL CALC-MCNC: 65 MG/DL (ref 0–100)
LDLC/HDLC SERPL: 3.3 {RATIO}
LYMPHOCYTES # BLD AUTO: 1.12 10*3/MM3 (ref 0.7–3.1)
LYMPHOCYTES NFR BLD AUTO: 22.5 % (ref 19.6–45.3)
MCH RBC QN AUTO: 30.9 PG (ref 26.6–33)
MCHC RBC AUTO-ENTMCNC: 33.2 G/DL (ref 31.5–35.7)
MCV RBC AUTO: 92.8 FL (ref 79–97)
MONOCYTES # BLD AUTO: 0.67 10*3/MM3 (ref 0.1–0.9)
MONOCYTES NFR BLD AUTO: 13.5 % (ref 5–12)
NEUTROPHILS NFR BLD AUTO: 2.9 10*3/MM3 (ref 1.7–7)
NEUTROPHILS NFR BLD AUTO: 58.4 % (ref 42.7–76)
NRBC BLD AUTO-RTO: 0 /100 WBC (ref 0–0.2)
NT-PROBNP SERPL-MCNC: 998 PG/ML (ref 0–900)
PLATELET # BLD AUTO: 207 10*3/MM3 (ref 140–450)
PMV BLD AUTO: 10.4 FL (ref 6–12)
POTASSIUM SERPL-SCNC: 3.3 MMOL/L (ref 3.5–5.2)
PROT SERPL-MCNC: 6.1 G/DL (ref 6–8.5)
RBC # BLD AUTO: 4.05 10*6/MM3 (ref 3.77–5.28)
SODIUM SERPL-SCNC: 138 MMOL/L (ref 136–145)
TRIGL SERPL-MCNC: 188 MG/DL (ref 0–150)
TSH SERPL DL<=0.05 MIU/L-ACNC: 1.21 UIU/ML (ref 0.27–4.2)
VLDLC SERPL-MCNC: 32 MG/DL (ref 5–40)
WBC NRBC COR # BLD: 4.97 10*3/MM3 (ref 3.4–10.8)

## 2022-07-07 PROCEDURE — G0378 HOSPITAL OBSERVATION PER HR: HCPCS

## 2022-07-07 PROCEDURE — 83880 ASSAY OF NATRIURETIC PEPTIDE: CPT | Performed by: HOSPITALIST

## 2022-07-07 PROCEDURE — 83036 HEMOGLOBIN GLYCOSYLATED A1C: CPT | Performed by: HOSPITALIST

## 2022-07-07 PROCEDURE — 80061 LIPID PANEL: CPT | Performed by: HOSPITALIST

## 2022-07-07 PROCEDURE — 80050 GENERAL HEALTH PANEL: CPT | Performed by: HOSPITALIST

## 2022-07-07 RX ORDER — CEFDINIR 300 MG/1
300 CAPSULE ORAL EVERY 12 HOURS SCHEDULED
Status: DISCONTINUED | OUTPATIENT
Start: 2022-07-07 | End: 2022-07-07 | Stop reason: HOSPADM

## 2022-07-07 RX ORDER — PANTOPRAZOLE SODIUM 40 MG/1
40 TABLET, DELAYED RELEASE ORAL
Qty: 30 TABLET | Refills: 0 | Status: SHIPPED | OUTPATIENT
Start: 2022-07-08 | End: 2022-08-07

## 2022-07-07 RX ORDER — ACETAMINOPHEN 325 MG/1
650 TABLET ORAL EVERY 4 HOURS PRN
Status: DISCONTINUED | OUTPATIENT
Start: 2022-07-07 | End: 2022-07-07

## 2022-07-07 RX ORDER — CEFDINIR 300 MG/1
300 CAPSULE ORAL EVERY 12 HOURS SCHEDULED
Qty: 10 CAPSULE | Refills: 0 | Status: SHIPPED | OUTPATIENT
Start: 2022-07-07 | End: 2022-07-12

## 2022-07-07 RX ADMIN — ACETAMINOPHEN 650 MG: 325 TABLET ORAL at 08:59

## 2022-07-07 RX ADMIN — ACETAMINOPHEN 650 MG: 325 TABLET ORAL at 00:59

## 2022-07-07 RX ADMIN — PANTOPRAZOLE SODIUM 40 MG: 40 TABLET, DELAYED RELEASE ORAL at 05:28

## 2022-07-07 RX ADMIN — ESCITALOPRAM 10 MG: 10 TABLET, FILM COATED ORAL at 08:59

## 2022-07-07 NOTE — CONSULTS
FIRST UROLOGY CONSULT      Patient Identification:  NAME:  Lizy Bryan  Age:  56 y.o.   Sex:  female   :  1965   MRN:  [id number]       Chief complaint: Flank pain    History of present illness: 56-year-old female recently seen at [facility] emergency room with flank pain.  Found of a distal ureteral calculus millimeters left ureter.  She passed it the following day.  She went back with chills fevers nausea.  Felt to be consistent with pyelonephritis.  Told she had to be admitted for IV antibiotics.  Her pain is now resolved.  She is feeling better.  Nausea improved.  She has no attendant urologist.      Past medical history:  Past Medical History:   Diagnosis Date   • Abnormal ECG    • COPD (chronic obstructive pulmonary disease) (HCC)    • Hyperlipidemia    • Hypertension        Past surgical history:  No past surgical history on file.    Allergies:  Patient has no known allergies.    Home medications:  Medications Prior to Admission   Medication Sig Dispense Refill Last Dose   • albuterol sulfate  (90 Base) MCG/ACT inhaler Inhale 2 puffs Every 4 (Four) Hours As Needed for Wheezing.   2022 at Unknown time   • escitalopram (LEXAPRO) 10 MG tablet Take 10 mg by mouth Daily.   Past Week at Unknown time   • ezetimibe (ZETIA) 10 MG tablet Take 10 mg by mouth Daily.   Past Week at Unknown time   • spironolactone (ALDACTONE) 25 MG tablet Take 50 mg by mouth Daily.   Past Week at Unknown time   • traZODone (DESYREL) 150 MG tablet Take 150 mg by mouth Every Night.   Past Week at Unknown time   • cetirizine (zyrTEC) 10 MG tablet Take 10 mg by mouth Every Evening.   More than a month at Unknown time   • meclizine (ANTIVERT) 12.5 MG tablet Take 12.5-25 mg by mouth 2 (Two) Times a Day As Needed.   More than a month at Unknown time       Hospital medications:  cefTRIAXone, 1 g, Intravenous, Q24H  cetirizine, 10 mg, Oral, Nightly  escitalopram, 10 mg, Oral, Daily  [START ON 2022]  pantoprazole, 40 mg, Oral, Q AM  traZODone, 150 mg, Oral, Nightly      sodium chloride 0.9 % with KCl 20 mEq, 75 mL/hr, Last Rate: 75 mL/hr (22 1349)      •  albuterol  •  HYDROmorphone  •  ondansetron  •  [COMPLETED] Insert peripheral IV **AND** sodium chloride    Family history:  Family History   Problem Relation Age of Onset   • Hypertension Mother    • Hyperlipidemia Mother    • Heart attack Mother    • Hypertension Father    • Hyperlipidemia Father    • Heart attack Father        Social history:  Social History     Tobacco Use   • Smoking status: Former Smoker   • Smokeless tobacco: Never Used   • Tobacco comment: quit    Vaping Use   • Vaping Use: Every day   • Substances: Nicotine   • Devices: Pre-filled or refillable cartridge   • Passive vaping exposure: Yes   Substance Use Topics   • Alcohol use: Never   • Drug use: Never       Review of systems:    Negative 12-system ROS except for the following: As above no current irritable urinary symptoms      Objective:  TMax 24 hours:   Temp (24hrs), Av.3 °F (37.4 °C), Min:97.9 °F (36.6 °C), Max:102.3 °F (39.1 °C)      Vitals Ranges:   Temp:  [97.9 °F (36.6 °C)-102.3 °F (39.1 °C)] 98.4 °F (36.9 °C)  Heart Rate:  [71-87] 78  Resp:  [18-20] 18  BP: ()/(50-85) 135/76    Intake/Output Last 3 shifts:  I/O last 3 completed shifts:  In: 1460 [P.O.:360; IV Piggyback:1100]  Out: -      Physical Exam:       General Appearance:    Alert, cooperative, in no acute distress   Head:    Normocephalic, without obvious abnormality, atraumatic   Eyes:          PERRL, conjunctivae and corneas clear   Ears:    Normal external inspection   Throat:   No oral lesions, oral mucosa moist   Neck:   Supple, no LAD, trachea midline   Back:     No CVA tenderness   Lungs:     Respirations unlabored, symmetric excursion    Heart:    RRR, intact peripheral pulses   Abdomen:    Soft benign minimally tender left flank   :   Furred   Extremities:   No edema, no deformity   Skin:    No bleeding, bruising or rashes   Neuro/Psych:   Orientation intact, mood/affect pleasant, no focal findings       Results review:   I reviewed the patient's new clinical results.    Data review:  Lab Results (last 24 hours)     Procedure Component Value Units Date/Time    STAT Lactic Acid, Reflex [[id number]]  (Normal) Collected: 07/06/22 0653    Specimen: Blood Updated: 07/06/22 0732     Lactate 1.0 mmol/L     COVID PRE-OP / PRE-PROCEDURE SCREENING ORDER (NO ISOLATION) - Swab, Nasopharynx [[id number]]  (Normal) Collected: 07/06/22 0327    Specimen: Swab from Nasopharynx Updated: 07/06/22 0412    Narrative:      The following orders were created for panel order COVID PRE-OP / PRE-PROCEDURE SCREENING ORDER (NO ISOLATION) - Swab, Nasopharynx.  Procedure                               Abnormality         Status                     ---------                               -----------         ------                     COVID-19,[facility]-HOUSE...[[id number]]  Normal              Final result                 Please view results for these tests on the individual orders.    COVID-19,[facility] CEPHEID/NATHAN NP SWAB IN TRANSPORT MEDIA 8-12 HR TAT - Swab, Nasopharynx [[id number]]  (Normal) Collected: 07/06/22 0327    Specimen: Swab from Nasopharynx Updated: 07/06/22 0412     COVID19 Not Detected    Narrative:      Fact sheet for providers: https://www.fda.gov/media/496091/download     Fact sheet for patients: https://www.fda.gov/media/928874/download    Urinalysis With Microscopic If Indicated (No Culture) - Urine, Clean Catch [[id number]]  (Abnormal) Collected: 07/06/22 0336    Specimen: Urine, Clean Catch Updated: 07/06/22 0348     Color, UA Yellow     Appearance, UA Clear     pH, UA 6.5     Specific Gravity, UA 1.010     Glucose, UA Negative     Ketones, UA Negative     Bilirubin, UA Negative     Blood, UA Trace     Protein, UA 30 mg/dL (1+)     Leuk Esterase, UA Trace     Nitrite, UA Negative     Urobilinogen, UA 1.0  "E.U./dL    Urinalysis, Microscopic Only - Urine, Clean Catch [[id number]]  (Abnormal) Collected: 07/06/22 0336    Specimen: Urine, Clean Catch Updated: 07/06/22 0348     RBC, UA 3-5 /HPF      WBC, UA 3-5 /HPF      Bacteria, UA None Seen /HPF      Squamous Epithelial Cells, UA 0-2 /HPF      Hyaline Casts, UA 0-2 /LPF      Methodology Automated Microscopy    Lactic Acid, Plasma [[id number]]  (Abnormal) Collected: 07/06/22 0249    Specimen: Blood Updated: 07/06/22 0334     Lactate 2.2 mmol/L     Procalcitonin [[id number]]  (Abnormal) Collected: 07/06/22 0249    Specimen: Blood Updated: 07/06/22 0327     Procalcitonin 1.23 ng/mL     Narrative:      As a Marker for Sepsis (Non-Neonates):    1. <0.5 ng/mL represents a low risk of severe sepsis and/or septic shock.  2. >2 ng/mL represents a high risk of severe sepsis and/or septic shock.    As a Marker for Lower Respiratory Tract Infections that require antibiotic therapy:    PCT on Admission    Antibiotic Therapy       6-12 Hrs later    >0.5                Strongly Recommended  >0.25 - <0.5        Recommended   0.1 - 0.25          Discouraged              Remeasure/reassess PCT  <0.1                Strongly Discouraged     Remeasure/reassess PCT    As 28 day mortality risk marker: \"Change in Procalcitonin Result\" (>80% or <=80%) if Day 0 (or Day 1) and Day 4 values are available. Refer to http://www.[employer]-pct-calculator.com    Change in PCT <=80%  A decrease of PCT levels below or equal to 80% defines a positive change in PCT test result representing a higher risk for 28-day all-cause mortality of patients diagnosed with severe sepsis for septic shock.    Change in PCT >80%  A decrease of PCT levels of more than 80% defines a negative change in PCT result representing a lower risk for 28-day all-cause mortality of patients diagnosed with severe sepsis or septic shock.       Comprehensive Metabolic Panel [[id number]]  (Abnormal) Collected: 07/06/22 0249    Specimen: Blood " Updated: 07/06/22 0320     Glucose 86 mg/dL      BUN 9 mg/dL      Creatinine 1.02 mg/dL      Sodium 137 mmol/L      Potassium 3.6 mmol/L      Comment: Slight hemolysis detected by analyzer. Results may be affected.        Chloride 101 mmol/L      CO2 23.0 mmol/L      Calcium 8.9 mg/dL      Total Protein 6.9 g/dL      Albumin 3.70 g/dL      ALT (SGPT) 32 U/L      AST (SGOT) 28 U/L      Alkaline Phosphatase 97 U/L      Total Bilirubin 0.5 mg/dL      Globulin 3.2 gm/dL      A/G Ratio 1.2 g/dL      BUN/Creatinine Ratio 8.8     Anion Gap 13.0 mmol/L      eGFR 64.7 mL/min/1.73      Comment: National Kidney Foundation and American Society of Nephrology (ASN) Task Force recommended calculation based on the Chronic Kidney Disease Epidemiology Collaboration (CKD-EPI) equation refit without adjustment for race.       Narrative:      GFR Normal >60  Chronic Kidney Disease <60  Kidney Failure <15      CBC & Differential [[id number]]  (Abnormal) Collected: 07/06/22 0249    Specimen: Blood Updated: 07/06/22 0306    Narrative:      The following orders were created for panel order CBC & Differential.  Procedure                               Abnormality         Status                     ---------                               -----------         ------                     CBC Auto Differential[[id number]]        Abnormal            Final result                 Please view results for these tests on the individual orders.    CBC Auto Differential [[id number]]  (Abnormal) Collected: 07/06/22 0249    Specimen: Blood Updated: 07/06/22 0306     WBC 5.22 10*3/mm3      RBC 4.47 10*6/mm3      Hemoglobin 14.3 g/dL      Hematocrit 39.9 %      MCV 89.3 fL      MCH 32.0 pg      MCHC 35.8 g/dL      RDW 13.0 %      RDW-SD 41.5 fl      MPV 10.7 fL      Platelets 205 10*3/mm3      Neutrophil % 72.0 %      Lymphocyte % 15.1 %      Monocyte % 8.2 %      Eosinophil % 3.1 %      Basophil % 1.0 %      Immature Grans % 0.6 %      Neutrophils, Absolute 3.76  10*3/mm3      Lymphocytes, Absolute 0.79 10*3/mm3      Monocytes, Absolute 0.43 10*3/mm3      Eosinophils, Absolute 0.16 10*3/mm3      Basophils, Absolute 0.05 10*3/mm3      Immature Grans, Absolute 0.03 10*3/mm3      nRBC 0.0 /100 WBC     Blood Culture - Blood, Arm, Left [[id number]] Collected: 07/06/22 0258    Specimen: Blood from Arm, Left Updated: 07/06/22 0305    Blood Culture - Blood, Arm, Left [[id number]] Collected: 07/06/22 0249    Specimen: Blood from Arm, Left Updated: 07/06/22 0258           Imaging:  Imaging Results (Last 24 Hours)     Procedure Component Value Units Date/Time    CT Abdomen Pelvis Without Contrast [[id number]] Collected: 07/06/22 0539     Updated: 07/06/22 0539    Narrative:        Patient: YVON BAKER  Time Out: 05:39  Exam(s): CT ABDOMEN + PELVIS Without Contrast     EXAM:    CT Abdomen and Pelvis Without Intravenous Contrast    CLINICAL HISTORY:     Reason for exam: right flank pain, fevers, hx of kidney stone.    TECHNIQUE:    Axial computed tomography images of the abdomen and pelvis without   intravenous contrast.  CTDI is 34.5 mGy and DLP is 1708.5 mGy-cm.  This   CT exam was performed according to the principle of ALARA (As Low As   Reasonably Achievable) by using one or more of the following dose   reduction techniques: automated exposure control, adjustment of the mA   and or kV according to patient size, and or use of iterative   reconstruction technique.    COMPARISON:    None    FINDINGS:    Lung bases:  Unremarkable.  No mass.  No consolidation.     ABDOMEN:    Liver:  Hepatomegaly.    Gallbladder and bile ducts:  Unremarkable.  No calcified stones.  No   ductal dilation.    Pancreas:  Unremarkable.  No ductal dilation.    Spleen:  Borderline size of the spleen.    Adrenals:  Unremarkable.  No mass.    Kidneys and ureters:  Punctate nonobstructing right renal stones.  No   hydronephrosis or definite ureteral stone.  Nonspecific left perinephric   fat stranding.  Minimal  fullness of the left renal collecting system.    Infection or recently passed stone would be difficult to exclude.    Stomach and bowel:  Evaluation of the stomach is limited by   underdistention.  No small bowel obstruction or inflammation.  No mucosal   thickening.     PELVIS:    Appendix:  Normal appendix.    Bladder:  Unremarkable.  No stones.    Reproductive:  Unremarkable as visualized.     ABDOMEN and PELVIS:    Intraperitoneal space:  Unremarkable.  No free air.  No significant   fluid collection.    Bones joints:  Mild degenerative changes of the spine.  No acute   fracture.  No dislocation.    Soft tissues:  Small fat-containing umbilical hernia.    Vasculature:  Atherosclerotic changes of the vasculature.  No aortic   aneurysm.    Lymph nodes:  Unremarkable.  No enlarged lymph nodes.    IMPRESSION:       1.  Punctate nonobstructing right renal stones.  No hydronephrosis or   definite ureteral stone.  2.  Nonspecific left perinephric fat stranding.  Minimal fullness of the   left renal collecting system.  Infection or recently passed stone would   be difficult to exclude.      Impression:          Electronically signed by Deon Green M.D. on 07-06-22 at 0539             Assessment:       Pyelonephritis    Left pyelonephritis with recent stone passage.    Plan:     No imminent need for stent placement.  She is improving.  Hopefully she can be discharged here in the next 24 to 48 hours we can follow her up in the office for further follow-up of her nonobstructing right renal calculi.    Charanjit Romeo MD  07/06/22  21:51 EDT

## 2022-07-07 NOTE — PLAN OF CARE
Problem: Adult Inpatient Plan of Care  Goal: Plan of Care Review  Outcome: Adequate for Care Transition  Goal: Patient-Specific Goal (Individualized)  Outcome: Adequate for Care Transition  Goal: Absence of Hospital-Acquired Illness or Injury  Outcome: Adequate for Care Transition  Intervention: Identify and Manage Fall Risk  Description: Perform standard risk assessment on admission using a validated tool or comprehensive approach appropriate to the patient; reassess fall risk frequently, with change in status or transfer to another level of care.  Communicate fall injury risk to interprofessional healthcare team.  Determine need for increased observation, equipment and environmental modification, such as low bed, signage and supportive, nonskid footwear.  Adjust safety measures to individual developmental age, stage and identified risk factors.  Reinforce the importance of safety and physical activity with patient and family.  Perform regular intentional rounding to assess need for position change, pain assessment and personal needs, including assistance with toileting.  Recent Flowsheet Documentation  Taken 7/7/2022 9070 by Marry Jean-Baptiste RN  Safety Promotion/Fall Prevention:   safety round/check completed   room organization consistent   nonskid shoes/slippers when out of bed  Intervention: Prevent Skin Injury  Description: Perform a screening for skin injury risk, such as pressure or moisture associated skin damage on admission and at regular intervals throughout hospital stay.  Keep all areas of skin (especially folds) clean and dry.  Maintain adequate skin hydration.  Relieve and redistribute pressure and protect bony prominences; implement measures based on patient-specific risk factors.  Match turning and repositioning schedule to clinical condition.  Encourage weight shift frequently; assist with reposition if unable to complete independently.  Float heels off bed; avoid pressure on the Achilles  tendon.  Keep skin free from extended contact with medical devices.  Encourage functional activity and mobility, as early as tolerated.  Use aids (e.g., slide boards, mechanical lift) during transfer.  Recent Flowsheet Documentation  Taken 7/7/2022 0859 by Marry Jean-Baptiste RN  Body Position: position changed independently  Skin Protection:   adhesive use limited   incontinence pads utilized   tubing/devices free from skin contact  Intervention: Prevent and Manage VTE (Venous Thromboembolism) Risk  Description: Assess for VTE (venous thromboembolism) risk.  Encourage and assist with early ambulation.  Initiate and maintain compression or other therapy, as indicated, based on identified risk in accordance with organizational protocol and provider order.  Encourage both active and passive leg exercises while in bed, if unable to ambulate.  Recent Flowsheet Documentation  Taken 7/7/2022 0859 by Marry Jean-Baptiste RN  Activity Management:   activity adjusted per tolerance   up ad ab  VTE Prevention/Management:   bilateral   dorsiflexion/plantar flexion performed   patient refused intervention  Range of Motion: active ROM (range of motion) encouraged  Intervention: Prevent Infection  Description: Maintain skin and mucous membrane integrity; promote hand, oral and pulmonary hygiene.  Optimize fluid balance, nutrition, sleep and glycemic control to maximize infection resistance.  Identify potential sources of infection early to prevent or mitigate progression of infection (e.g., wound, lines, devices).  Evaluate ongoing need for invasive devices; remove promptly when no longer indicated.  Recent Flowsheet Documentation  Taken 7/7/2022 1000 by Marry Jean-Baptiste RN  Infection Prevention:   hand hygiene promoted   personal protective equipment utilized   single patient room provided   visitors restricted/screened  Taken 7/7/2022 0800 by Marry Jean-Baptiste RN  Infection Prevention:   hand hygiene promoted   personal  protective equipment utilized   single patient room provided   visitors restricted/screened  Goal: Optimal Comfort and Wellbeing  Outcome: Adequate for Care Transition  Intervention: Provide Person-Centered Care  Description: Use a family-focused approach to care.  Develop trust and rapport by proactively providing information, encouraging questions, addressing concerns and offering reassurance.  Acknowledge emotional response to hospitalization.  Recognize and utilize personal coping strategies.  Honor spiritual and cultural preferences.  Recent Flowsheet Documentation  Taken 7/7/2022 0859 by Marry Jean-Baptiste RN  Trust Relationship/Rapport:   care explained   choices provided   emotional support provided   empathic listening provided   questions answered   reassurance provided   questions encouraged   thoughts/feelings acknowledged  Goal: Readiness for Transition of Care  Outcome: Adequate for Care Transition   Goal Outcome Evaluation:

## 2022-07-07 NOTE — PLAN OF CARE
Goal Outcome Evaluation:  Plan of Care Reviewed With: patient        Progress: improving  Outcome Evaluation: pt is alert and oriented, room air, cpap while sleeping, no falls, up ad ab, c/o some SOA at times/swelling, IVF off, continue to monitor

## 2022-07-07 NOTE — DISCHARGE SUMMARY
Discharge summary    Date of admission 7/6/2022  Date of discharge 7/7/2022    Final diagnosis  Acute left pyelonephritis with sepsis  Kidney stones  Morbidly obese  COPD  Depression  Gastroesophageal reflux disease     Discharge medications  No current facility-administered medications for this encounter.    Current Outpatient Medications:   •  albuterol sulfate  (90 Base) MCG/ACT inhaler, Inhale 2 puffs Every 4 (Four) Hours As Needed for Wheezing., Disp: , Rfl:   •  cefdinir (OMNICEF) 300 MG capsule, Take 1 capsule by mouth Every 12 (Twelve) Hours for 10 doses. Indications: Urinary Tract Infection, Disp: 10 capsule, Rfl: 0  •  escitalopram (LEXAPRO) 10 MG tablet, Take 10 mg by mouth Daily., Disp: , Rfl:   •  ezetimibe (ZETIA) 10 MG tablet, Take 10 mg by mouth Daily., Disp: , Rfl:   •  [START ON 7/8/2022] pantoprazole (PROTONIX) 40 MG EC tablet, Take 1 tablet by mouth Every Morning for 30 days., Disp: 30 tablet, Rfl: 0  •  spironolactone (ALDACTONE) 25 MG tablet, Take 50 mg by mouth Daily., Disp: , Rfl:   •  traZODone (DESYREL) 150 MG tablet, Take 150 mg by mouth Every Night., Disp: , Rfl:   •  cetirizine (zyrTEC) 10 MG tablet, Take 10 mg by mouth Every Evening., Disp: , Rfl:   •  meclizine (ANTIVERT) 12.5 MG tablet, Take 12.5-25 mg by mouth 2 (Two) Times a Day As Needed., Disp: , Rfl:      Consults obtained  Urology    Procedures  None    Hospital course  56-year white female with history of COPD morbidly obese hypertension hyperlipidemia and kidney stones admitted through emergency room with left flank pain fever chills nausea.  Patient work-up in ER revealed acute left pyelonephritis admit for management.  Patient recently evaluated by urology and found to have 3 mm left ureteral stone which she already passed.  Patient responded to the treatment with IV fluid IV antibiotics and wants to go home.  Patient further evaluated by urology and recommend to discharge on oral antibiotics.  At the time of  discharge she is afebrile vital signs stable and all of her labs looks normal and her cultures still negative.  Patient okay to go home on oral antibiotics and follow-up with urology.    Discharge diet regular    Activity as tolerated    Medication as above    Follow-up with prime doctor in 1 week and follow-up with urology per the instruction and take medication as directed.    ALEJANDRA BAKER MD

## 2022-07-07 NOTE — PROGRESS NOTES
"Daily progress note    Chief complaint  Doing better  No new complaints  Wants to go home    History of present illness  56-year-old white female with history of COPD morbidly obese hypertension hyperlipidemia and kidney stones in the past presented to [facility] emergency room with left flank pain fever chills nausea for last few days.  Patient has been evaluated and found to have 3 mm left ureter stone but no symptoms develop and she came back to the ER and work-up revealed acute left pyelonephritis with sepsis admit for management.  Patient apparently passed a kidney stone and feeling better.  Patient denies any chest pain vomiting diarrhea.     REVIEW OF SYSTEMS  All systems reviewed and negative except for those discussed in HPI.      PHYSICAL EXAM  Blood pressure 124/66, pulse 60, temperature 98.4 °F (36.9 °C), temperature source Oral, resp. rate 20, height 162.6 cm (64\"), weight 113 kg (249 lb 12.8 oz), SpO2 96 %.    GENERAL: Heart distressed  HENT: nares patent  EYES: no scleral icterus  CV: regular rhythm, regular rate  RESPIRATORY: normal effort clear to station bilaterally  ABDOMEN: soft, left flank tenderness nondistended bowel sounds positive  MUSCULOSKELETAL: no deformity  NEURO: alert, moves all extremities, follows commands  SKIN: warm, dry     LAB RESULTS  Lab Results (last 24 hours)     Procedure Component Value Units Date/Time    BNP [[id number]]  (Abnormal) Collected: 07/07/22 0543    Specimen: Blood Updated: 07/07/22 0625     proBNP 998.0 pg/mL     Narrative:      Among patients with dyspnea, NT-proBNP is highly sensitive for the detection of acute congestive heart failure. In addition NT-proBNP of <300 pg/ml effectively rules out acute congestive heart failure with 99% negative predictive value.    Results may be falsely decreased if patient taking Biotin.      TSH [[id number]]  (Normal) Collected: 07/07/22 0543    Specimen: Blood Updated: 07/07/22 0625     TSH 1.210 uIU/mL     Lipid " Panel [[id number]]  (Abnormal) Collected: 07/07/22 0543    Specimen: Blood Updated: 07/07/22 0622     Total Cholesterol 115 mg/dL      Triglycerides 188 mg/dL      HDL Cholesterol 18 mg/dL      LDL Cholesterol  65 mg/dL      VLDL Cholesterol 32 mg/dL      LDL/HDL Ratio 3.30    Narrative:      Cholesterol Reference Ranges  (U.S. Department of Health and Human Services ATP III Classifications)    Desirable          <200 mg/dL  Borderline High    200-239 mg/dL  High Risk          >240 mg/dL      Triglyceride Reference Ranges  (U.S. Department of Health and Human Services ATP III Classifications)    Normal           <150 mg/dL  Borderline High  150-199 mg/dL  High             200-499 mg/dL  Very High        >500 mg/dL    HDL Reference Ranges  (U.S. Department of Health and Human Services ATP III Classifications)    Low     <40 mg/dl (major risk factor for CHD)  High    >60 mg/dl ('negative' risk factor for CHD)        LDL Reference Ranges  (U.S. Department of Health and Human Services ATP III Classifications)    Optimal          <100 mg/dL  Near Optimal     100-129 mg/dL  Borderline High  130-159 mg/dL  High             160-189 mg/dL  Very High        >189 mg/dL    Comprehensive Metabolic Panel [[id number]]  (Abnormal) Collected: 07/07/22 0543    Specimen: Blood Updated: 07/07/22 0622     Glucose 119 mg/dL      BUN 8 mg/dL      Creatinine 0.84 mg/dL      Sodium 138 mmol/L      Potassium 3.3 mmol/L      Chloride 106 mmol/L      CO2 22.0 mmol/L      Calcium 8.7 mg/dL      Total Protein 6.1 g/dL      Albumin 3.20 g/dL      ALT (SGPT) 31 U/L      AST (SGOT) 20 U/L      Alkaline Phosphatase 82 U/L      Total Bilirubin 0.4 mg/dL      Globulin 2.9 gm/dL      A/G Ratio 1.1 g/dL      BUN/Creatinine Ratio 9.5     Anion Gap 10.0 mmol/L      eGFR 81.7 mL/min/1.73      Comment: National Kidney Foundation and American Society of Nephrology (ASN) Task Force recommended calculation based on the Chronic Kidney Disease Epidemiology  Collaboration (CKD-EPI) equation refit without adjustment for race.       Narrative:      GFR Normal >60  Chronic Kidney Disease <60  Kidney Failure <15      Hemoglobin A1c [[id number]]  (Normal) Collected: 07/07/22 0543    Specimen: Blood Updated: 07/07/22 0610     Hemoglobin A1C 5.50 %     Narrative:      Hemoglobin A1C Ranges:    Increased Risk for Diabetes  5.7% to 6.4%  Diabetes                     >= 6.5%  Diabetic Goal                < 7.0%    CBC & Differential [[id number]]  (Abnormal) Collected: 07/07/22 0543    Specimen: Blood Updated: 07/07/22 0603    Narrative:      The following orders were created for panel order CBC & Differential.  Procedure                               Abnormality         Status                     ---------                               -----------         ------                     CBC Auto Differential[[id number]]        Abnormal            Final result                 Please view results for these tests on the individual orders.    CBC Auto Differential [[id number]]  (Abnormal) Collected: 07/07/22 0543    Specimen: Blood Updated: 07/07/22 0603     WBC 4.97 10*3/mm3      RBC 4.05 10*6/mm3      Hemoglobin 12.5 g/dL      Hematocrit 37.6 %      MCV 92.8 fL      MCH 30.9 pg      MCHC 33.2 g/dL      RDW 13.3 %      RDW-SD 45.3 fl      MPV 10.4 fL      Platelets 207 10*3/mm3      Neutrophil % 58.4 %      Lymphocyte % 22.5 %      Monocyte % 13.5 %      Eosinophil % 3.4 %      Basophil % 1.0 %      Immature Grans % 1.2 %      Neutrophils, Absolute 2.90 10*3/mm3      Lymphocytes, Absolute 1.12 10*3/mm3      Monocytes, Absolute 0.67 10*3/mm3      Eosinophils, Absolute 0.17 10*3/mm3      Basophils, Absolute 0.05 10*3/mm3      Immature Grans, Absolute 0.06 10*3/mm3      nRBC 0.0 /100 WBC     Blood Culture - Blood, Arm, Left [[id number]]  (Normal) Collected: 07/06/22 0258    Specimen: Blood from Arm, Left Updated: 07/07/22 0317     Blood Culture No growth at 24 hours    Blood Culture - Blood,  Arm, Left [[id number]]  (Normal) Collected: 07/06/22 0249    Specimen: Blood from Arm, Left Updated: 07/07/22 0302     Blood Culture No growth at 24 hours        Imaging Results (Last 24 Hours)     ** No results found for the last 24 hours. **          Current Facility-Administered Medications:   •  cefdinir (OMNICEF) capsule 300 mg, 300 mg, Oral, Q12H, Alejandra Yan MD  •  cetirizine (zyrTEC) tablet 10 mg, 10 mg, Oral, Nightly, Alejandra Yan MD, 10 mg at 07/06/22 1958  •  escitalopram (LEXAPRO) tablet 10 mg, 10 mg, Oral, Daily, Alejandra Yan MD, 10 mg at 07/07/22 0859  •  pantoprazole (PROTONIX) EC tablet 40 mg, 40 mg, Oral, Q AM, Alejandra Yan MD, 40 mg at 07/07/22 0528  •  [COMPLETED] Insert peripheral IV, , , Once **AND** sodium chloride 0.9 % flush 10 mL, 10 mL, Intravenous, PRN, Luís Munguia MD  •  traZODone (DESYREL) tablet 150 mg, 150 mg, Oral, Nightly, Alejandra Yan MD, 150 mg at 07/06/22 1957    ASSESSMENT  Acute left pyelonephritis with sepsis  Kidney stones  Morbidly obese  COPD  Depression  Gastroesophageal reflux disease    PLAN  Discharge home  Dischsrge summary dictated    ALEJANDRA YAN MD

## 2022-07-11 LAB
BACTERIA SPEC AEROBE CULT: NORMAL
BACTERIA SPEC AEROBE CULT: NORMAL

## 2022-10-03 ENCOUNTER — OFFICE VISIT (OUTPATIENT)
Dept: FAMILY MEDICINE CLINIC | Facility: CLINIC | Age: 57
End: 2022-10-03

## 2022-10-03 VITALS
HEIGHT: 63 IN | HEART RATE: 81 BPM | OXYGEN SATURATION: 97 % | BODY MASS INDEX: 44.4 KG/M2 | WEIGHT: 250.6 LBS | TEMPERATURE: 98 F | DIASTOLIC BLOOD PRESSURE: 82 MMHG | SYSTOLIC BLOOD PRESSURE: 122 MMHG

## 2022-10-03 DIAGNOSIS — E66.01 MORBID OBESITY WITH BMI OF 40.0-44.9, ADULT: ICD-10-CM

## 2022-10-03 DIAGNOSIS — E78.2 MIXED HYPERLIPIDEMIA: ICD-10-CM

## 2022-10-03 DIAGNOSIS — Z76.89 ENCOUNTER TO ESTABLISH CARE: Primary | ICD-10-CM

## 2022-10-03 DIAGNOSIS — J44.9 CHRONIC OBSTRUCTIVE PULMONARY DISEASE, UNSPECIFIED COPD TYPE: ICD-10-CM

## 2022-10-03 DIAGNOSIS — Z12.31 ENCOUNTER FOR SCREENING MAMMOGRAM FOR MALIGNANT NEOPLASM OF BREAST: ICD-10-CM

## 2022-10-03 DIAGNOSIS — Z76.89 ENCOUNTER FOR WEIGHT MANAGEMENT: ICD-10-CM

## 2022-10-03 PROBLEM — F41.9 ANXIETY: Status: ACTIVE | Noted: 2022-10-03

## 2022-10-03 PROBLEM — F41.0 PANIC ATTACK: Status: ACTIVE | Noted: 2022-10-03

## 2022-10-03 PROBLEM — K21.9 GASTROESOPHAGEAL REFLUX DISEASE: Status: ACTIVE | Noted: 2022-10-03

## 2022-10-03 PROBLEM — R07.9 CHEST PAIN: Status: ACTIVE | Noted: 2021-07-12

## 2022-10-03 PROBLEM — F32.A DEPRESSIVE DISORDER: Status: ACTIVE | Noted: 2022-10-03

## 2022-10-03 PROCEDURE — 99204 OFFICE O/P NEW MOD 45 MIN: CPT | Performed by: NURSE PRACTITIONER

## 2022-10-03 RX ORDER — CHOLECALCIFEROL (VITAMIN D3) 25 MCG
TABLET,CHEWABLE ORAL
COMMUNITY
Start: 2022-07-05 | End: 2022-12-10 | Stop reason: SDUPTHER

## 2022-10-03 RX ORDER — TIOTROPIUM BROMIDE INHALATION SPRAY 1.56 UG/1
SPRAY, METERED RESPIRATORY (INHALATION)
COMMUNITY
Start: 2022-09-08 | End: 2022-12-10 | Stop reason: SDUPTHER

## 2022-10-03 NOTE — PROGRESS NOTES
"Chief Complaint  Establish Care    Subjective        Lizy Bryan presents to [facility] PRIMARY CARE  History of Present Illness   56-year-old female presenting to \[facility]\"" care and discuss weight loss options, previous PCP with [facility].  Annual physical with fasting labs recently obtained in July.  With hyperlipidemia, takes Zetia 10 mg daily.  With anxiety/depression, controlled with Lexapro 10 mg daily.  With COPD, takes Spiriva 2 inhalations daily and albuterol as needed, also has obstructive sleep apnea, wears CPAP.  She reports having to quit work to care for sick spouse whom passed away in January, states made it difficult to exercise but has tried multiple diets with minimal success, she was also evaluated by bariatric surgery for gastric sleeve but was not a good candidate and did not want to explore gastric bypass at that time.  Her current BMI is 44.39, I believe she would be a good candidate for Saxenda along with diet regimen, exercise is limited related to COPD.  She has extensive family history of DM 2, her last A1c 5.50 on 7/6/2022.     Objective   Vital Signs:  /82 (BP Location: Left arm, Patient Position: Sitting, Cuff Size: Large Adult)   Pulse 81   Temp 98 °F (36.7 °C) (Infrared)   Ht 160 cm (63\")   Wt 114 kg (250 lb 9.6 oz)   SpO2 97%   BMI 44.39 kg/m²   Estimated body mass index is 44.39 kg/m² as calculated from the following:    Height as of this encounter: 160 cm (63\").    Weight as of this encounter: 114 kg (250 lb 9.6 oz).    Class 3 Severe Obesity (BMI >=40). Obesity-related health conditions include the following: obstructive sleep apnea, dyslipidemias and COPD. Obesity is unchanged. BMI is is above average; BMI management plan is completed. We discussed portion control and increasing exercise.      Physical Exam  Constitutional:       Appearance: She is obese.   Cardiovascular:      Rate and Rhythm: Normal rate.      Pulses: Normal pulses.     "  Heart sounds: Normal heart sounds.   Pulmonary:      Effort: Pulmonary effort is normal.      Breath sounds: Normal breath sounds.   Abdominal:      General: Bowel sounds are normal.      Palpations: Abdomen is soft.   Neurological:      General: No focal deficit present.      Mental Status: She is alert and oriented to person, place, and time.   Psychiatric:         Mood and Affect: Mood normal.         Behavior: Behavior normal.         Thought Content: Thought content normal.         Judgment: Judgment normal.        Result Review :                Assessment and Plan   Diagnoses and all orders for this visit:    1. Encounter to establish care (Primary)    2. Encounter for weight management  -     Liraglutide (SAXENDA) 18 MG/3ML injection pen; Inject 0.6mg under skin daily for week one, THEN 1.2mg daily for week two, THEN 1.8mg daily for week three, then 2.4mg daily for week four.  Dispense: 3 mL; Refill: 3    3. Morbid obesity with BMI of 40.0-44.9, adult (HCC)  -     Liraglutide (SAXENDA) 18 MG/3ML injection pen; Inject 0.6mg under skin daily for week one, THEN 1.2mg daily for week two, THEN 1.8mg daily for week three, then 2.4mg daily for week four.  Dispense: 3 mL; Refill: 3    4. Chronic obstructive pulmonary disease, unspecified COPD type (HCC)    5. Encounter for screening mammogram for malignant neoplasm of breast  -     Mammo screening digital tomosynthesis bilateral w CAD; Future    6. Mixed hyperlipidemia           I spent 30 minutes caring for Lizy on this date of service. This time includes time spent by me in the following activities:reviewing tests, obtaining and/or reviewing a separately obtained history, performing a medically appropriate examination and/or evaluation , counseling and educating the patient/family/caregiver, ordering medications, tests, or procedures, documenting information in the medical record and independently interpreting results and communicating that information with the  patient/family/caregiver  Follow Up   Return in about 1 month (around 11/3/2022) for Recheck.  Patient was given instructions and counseling regarding her condition or for health maintenance advice. Please see specific information pulled into the AVS if appropriate.     Continue current medication regimen, diet and exercise. Follow-up in one month to evaluate effectiveness and toleration of Saxenda.     Mask and goggles worn.

## 2022-10-05 ENCOUNTER — TELEPHONE (OUTPATIENT)
Dept: FAMILY MEDICINE CLINIC | Facility: CLINIC | Age: 57
End: 2022-10-05

## 2022-10-05 NOTE — TELEPHONE ENCOUNTER
Caller: Lizy Bryan    Relationship: Self    Best call back number: [phone]     What is the best time to reach you: ANYTIME     Who are you requesting to speak with (clinical staff, provider,  specific staff member): CLINICAL     What was the call regarding: PATIENT CALLING STATING THAT SAMMI SENT A PRIOR AUTH FORM FOR THE Liraglutide (SAXENDA) 18 MG/3ML injection pen SHE WOULD LIKE TO KNOW IF THIS HAS BEEN RECEIVED IT YET    Do you require a callback: YES

## 2022-11-02 ENCOUNTER — HOSPITAL ENCOUNTER (OUTPATIENT)
Dept: MAMMOGRAPHY | Facility: HOSPITAL | Age: 57
Discharge: HOME | End: 2022-11-02
Admitting: NURSE PRACTITIONER

## 2022-11-02 ENCOUNTER — APPOINTMENT (OUTPATIENT)
Dept: OTHER | Facility: HOSPITAL | Age: 57
End: 2022-11-02

## 2022-11-02 DIAGNOSIS — Z12.31 ENCOUNTER FOR SCREENING MAMMOGRAM FOR MALIGNANT NEOPLASM OF BREAST: ICD-10-CM

## 2022-11-02 DIAGNOSIS — R92.8 ABNORMAL SCREENING MAMMOGRAM: Primary | ICD-10-CM

## 2022-11-02 DIAGNOSIS — Z09 FOLLOW-UP EXAM: ICD-10-CM

## 2022-11-02 PROCEDURE — 77063 BREAST TOMOSYNTHESIS BI: CPT

## 2022-11-02 PROCEDURE — 77067 SCR MAMMO BI INCL CAD: CPT

## 2022-11-07 ENCOUNTER — OFFICE VISIT (OUTPATIENT)
Dept: FAMILY MEDICINE CLINIC | Facility: CLINIC | Age: 57
End: 2022-11-07

## 2022-11-07 VITALS
OXYGEN SATURATION: 98 % | HEIGHT: 63 IN | HEART RATE: 77 BPM | SYSTOLIC BLOOD PRESSURE: 132 MMHG | WEIGHT: 242 LBS | TEMPERATURE: 98.6 F | BODY MASS INDEX: 42.88 KG/M2 | DIASTOLIC BLOOD PRESSURE: 78 MMHG

## 2022-11-07 DIAGNOSIS — Z76.89 ENCOUNTER FOR WEIGHT MANAGEMENT: Primary | ICD-10-CM

## 2022-11-07 DIAGNOSIS — Z09 FOLLOW-UP EXAMINATION: ICD-10-CM

## 2022-11-07 DIAGNOSIS — E66.01 MORBID OBESITY WITH BMI OF 40.0-44.9, ADULT: ICD-10-CM

## 2022-11-07 PROCEDURE — 99213 OFFICE O/P EST LOW 20 MIN: CPT | Performed by: NURSE PRACTITIONER

## 2022-11-07 NOTE — PATIENT INSTRUCTIONS
Continue medication, diet and exercise regimen. Follow-up in one month and come fasting to appointment.

## 2022-11-07 NOTE — PROGRESS NOTES
"Chief Complaint  Follow-up (saxenda)    Subjective        Lizy Bryan presents to [facility] PRIMARY CARE  History of Present Illness   56-year-old female presenting for follow-up weight management.  She was started on Saxenda on 10/3/2022, her weight at that time was 250 pounds, today she is at 242 pounds with a total 8 pound weight loss, she is currently taking Saxenda 2.4 mg, reports is tolerating medication well with no side effect's, states she did take 1 day of 3.0 mg and felt jittery, recommended resuming with 2.4 mg daily with follow-up in 1 month.  She continues with low calorie low-fat diet and exercise regimen.      Objective   Vital Signs:  /78 (BP Location: Left arm, Patient Position: Sitting, Cuff Size: Large Adult)   Pulse 77   Temp 98.6 °F (37 °C) (Infrared)   Ht 160 cm (63\")   Wt 110 kg (242 lb)   SpO2 98%   BMI 42.87 kg/m²   Estimated body mass index is 42.87 kg/m² as calculated from the following:    Height as of this encounter: 160 cm (63\").    Weight as of this encounter: 110 kg (242 lb).    Class 3 Severe Obesity (BMI >=40). Obesity-related health conditions include the following: hypertension and dyslipidemias. Obesity is improving with lifestyle modifications. BMI is is above average; BMI management plan is completed. We discussed portion control, increasing exercise and pharmacologic options including Saxenda.      Physical Exam  Cardiovascular:      Rate and Rhythm: Normal rate.      Pulses: Normal pulses.      Heart sounds: Normal heart sounds.   Pulmonary:      Effort: Pulmonary effort is normal.      Breath sounds: Normal breath sounds.   Abdominal:      General: Bowel sounds are normal.      Palpations: Abdomen is soft.   Neurological:      General: No focal deficit present.      Mental Status: She is alert and oriented to person, place, and time.   Psychiatric:         Mood and Affect: Mood normal.         Behavior: Behavior normal.         Thought " Content: Thought content normal.         Judgment: Judgment normal.        Result Review :                Assessment and Plan   Diagnoses and all orders for this visit:    1. Encounter for weight management (Primary)  Comments:  Continue Saxenda 2.4 mg daily, along with diet and exercise regimen.  Follow-up in 1 month.    2. Morbid obesity with BMI of 40.0-44.9, adult (HCC)  Comments:  Continue low calorie low-fat diet and exercise regimen with 30 minutes of cardio 3-4 times weekly.    3. Follow-up examination             Follow Up   Return in about 1 month (around 12/7/2022), or if symptoms worsen or fail to improve, for Recheck.  Patient was given instructions and counseling regarding her condition or for health maintenance advice. Please see specific information pulled into the AVS if appropriate.       Answers for HPI/ROS submitted by the patient on 10/31/2022  Please describe your symptoms.: Follow up for [place]  Have you had these symptoms before?: No  How long have you been having these symptoms?: Greater than 2 weeks  Please list any medications you are currently taking for this condition.: Sandexa  What is the primary reason for your visit?: Other

## 2022-11-30 ENCOUNTER — HOSPITAL ENCOUNTER (OUTPATIENT)
Dept: ULTRASOUND IMAGING | Facility: HOSPITAL | Age: 57
Discharge: HOME | End: 2022-11-30

## 2022-11-30 ENCOUNTER — HOSPITAL ENCOUNTER (OUTPATIENT)
Dept: MAMMOGRAPHY | Facility: HOSPITAL | Age: 57
Discharge: HOME | End: 2022-11-30

## 2022-11-30 DIAGNOSIS — R92.8 ABNORMAL SCREENING MAMMOGRAM: ICD-10-CM

## 2022-11-30 PROCEDURE — G0279 TOMOSYNTHESIS, MAMMO: HCPCS

## 2022-11-30 PROCEDURE — 76642 ULTRASOUND BREAST LIMITED: CPT

## 2022-11-30 PROCEDURE — 77066 DX MAMMO INCL CAD BI: CPT

## 2022-12-07 ENCOUNTER — OFFICE VISIT (OUTPATIENT)
Dept: FAMILY MEDICINE CLINIC | Facility: CLINIC | Age: 57
End: 2022-12-07

## 2022-12-07 VITALS
OXYGEN SATURATION: 98 % | WEIGHT: 241 LBS | SYSTOLIC BLOOD PRESSURE: 112 MMHG | HEART RATE: 79 BPM | HEIGHT: 63 IN | TEMPERATURE: 98.2 F | BODY MASS INDEX: 42.7 KG/M2 | DIASTOLIC BLOOD PRESSURE: 74 MMHG

## 2022-12-07 DIAGNOSIS — Z76.89 ENCOUNTER FOR WEIGHT MANAGEMENT: Primary | ICD-10-CM

## 2022-12-07 DIAGNOSIS — Z12.11 COLON CANCER SCREENING: ICD-10-CM

## 2022-12-07 DIAGNOSIS — M54.42 ACUTE MIDLINE LOW BACK PAIN WITH LEFT-SIDED SCIATICA: ICD-10-CM

## 2022-12-07 DIAGNOSIS — E55.9 VITAMIN D DEFICIENCY: ICD-10-CM

## 2022-12-07 DIAGNOSIS — Z11.59 ENCOUNTER FOR HEPATITIS C SCREENING TEST FOR LOW RISK PATIENT: ICD-10-CM

## 2022-12-07 DIAGNOSIS — E87.6 HYPOKALEMIA: ICD-10-CM

## 2022-12-07 DIAGNOSIS — M54.2 ACUTE NECK PAIN: ICD-10-CM

## 2022-12-07 DIAGNOSIS — E78.2 MIXED HYPERLIPIDEMIA: ICD-10-CM

## 2022-12-07 PROCEDURE — 99214 OFFICE O/P EST MOD 30 MIN: CPT | Performed by: NURSE PRACTITIONER

## 2022-12-07 NOTE — PROGRESS NOTES
"Chief Complaint  Follow-up (Weight loss) and Memory Loss    Subjective        Lizy Bryan presents to [facility] PRIMARY CARE  History of Present Illness   56-year-old female presenting for follow-up weight management, she was started on Saxenda on 10/3/2022, she has had a total 9 pound weight loss, will continue maintenance dose of 3 mg daily, educated on low calorie low-fat diet and exercise regimen.  She also has complaints of neck and low back pain with left sciatica, no imaging on file, will obtain x-ray imaging and place referral to physical therapy.  With hyperlipidemia, taking Zetia 10 mg daily, last lipid panel 6 months ago, will repeat.  With vitamin D deficiency, taking OTC vitamin D supplement, will repeat vitamin D level.  Her last CMP on 7/7/2022 showed hypokalemia, will repeat CMP.  She also agrees to colonoscopy, referral placed.    Objective   Vital Signs:  /74 (BP Location: Left arm, Patient Position: Sitting, Cuff Size: Large Adult)   Pulse 79   Temp 98.2 °F (36.8 °C) (Infrared)   Ht 160 cm (63\")   Wt 109 kg (241 lb)   SpO2 98%   BMI 42.69 kg/m²   Estimated body mass index is 42.69 kg/m² as calculated from the following:    Height as of this encounter: 160 cm (63\").    Weight as of this encounter: 109 kg (241 lb).    Class 3 Severe Obesity (BMI >=40). Obesity-related health conditions include the following: obstructive sleep apnea and dyslipidemias. Obesity is improving with treatment. BMI is is above average; BMI management plan is completed. We discussed portion control, increasing exercise and pharmacologic options including Saxenda.      Physical Exam  Constitutional:       Appearance: She is obese.   Cardiovascular:      Rate and Rhythm: Normal rate.      Pulses: Normal pulses.      Heart sounds: Normal heart sounds.   Pulmonary:      Effort: Pulmonary effort is normal.      Breath sounds: Normal breath sounds.   Abdominal:      General: Bowel sounds are " normal.      Palpations: Abdomen is soft.   Musculoskeletal:      Cervical back: Tenderness present. Decreased range of motion.      Lumbar back: Tenderness present.   Neurological:      General: No focal deficit present.      Mental Status: She is alert and oriented to person, place, and time.   Psychiatric:         Mood and Affect: Mood normal.         Behavior: Behavior normal.        Result Review :                Assessment and Plan   Diagnoses and all orders for this visit:    1. Encounter for weight management (Primary)  -     Liraglutide (SAXENDA) 18 MG/3ML injection pen; Inject 3 mg under the skin into the appropriate area as directed Daily.  Dispense: 15 mL; Refill: 5    2. Hypokalemia  -     Comprehensive metabolic panel; Future    3. Mixed hyperlipidemia  -     Lipid Panel; Future    4. Acute neck pain  -     Ambulatory Referral to Physical Therapy Evaluate and treat  -     XR Spine Cervical Complete 4 or 5 View; Future    5. Acute midline low back pain with left-sided sciatica  -     XR Spine Lumbar 4+ View; Future  -     Ambulatory Referral to Physical Therapy Evaluate and treat    6. Vitamin D deficiency  -     Vitamin D,25-Hydroxy; Future    7. Colon cancer screening  -     Ambulatory Referral For Screening Colonoscopy    8. Encounter for hepatitis C screening test for low risk patient  -     Hepatitis C antibody; Future           I spent 30 minutes caring for Lizy on this date of service. This time includes time spent by me in the following activities:reviewing tests, obtaining and/or reviewing a separately obtained history, performing a medically appropriate examination and/or evaluation , counseling and educating the patient/family/caregiver, ordering medications, tests, or procedures, referring and communicating with other health care professionals  and documenting information in the medical record  Follow Up   Return if symptoms worsen or fail to improve.  Patient was given instructions and  counseling regarding her condition or for health maintenance advice. Please see specific information pulled into the AVS if appropriate.     Continue Saxenda 3 ml daily, along with 1293-2130 calories a day and exercise regimen. Schedule an appointment for lab only and come fasting. Follow-up pending lab results.  Use of ice/heat therapy for neck and back pain along with stretching exercises.    Mask and gloves worn      Answers for HPI/ROS submitted by the patient on 11/30/2022  Please describe your symptoms.: Follow up for Saxenda and memory evaluation  Have you had these symptoms before?: Yes  How long have you been having these symptoms?: Greater than 2 weeks  What is the primary reason for your visit?: Other

## 2022-12-07 NOTE — PATIENT INSTRUCTIONS
Continue Saxenda 3 ml daily, along with 1727-3581 calories a day and exercise regimen. Schedule an appointment for lab only and come fasting. Follow-up pending lab results. Use of ice/heat therapy for neck and back pain along with stretching exercises.

## 2022-12-12 ENCOUNTER — LAB (OUTPATIENT)
Dept: FAMILY MEDICINE CLINIC | Facility: CLINIC | Age: 57
End: 2022-12-12

## 2022-12-12 DIAGNOSIS — E55.9 VITAMIN D DEFICIENCY: ICD-10-CM

## 2022-12-12 DIAGNOSIS — Z11.59 ENCOUNTER FOR HEPATITIS C SCREENING TEST FOR LOW RISK PATIENT: ICD-10-CM

## 2022-12-12 DIAGNOSIS — E78.2 MIXED HYPERLIPIDEMIA: ICD-10-CM

## 2022-12-12 DIAGNOSIS — E87.6 HYPOKALEMIA: Primary | ICD-10-CM

## 2022-12-12 RX ORDER — ALBUTEROL SULFATE 90 UG/1
2 AEROSOL, METERED RESPIRATORY (INHALATION) EVERY 4 HOURS PRN
Qty: 18 G | Refills: 4 | Status: SHIPPED | OUTPATIENT
Start: 2022-12-12

## 2022-12-12 RX ORDER — MECLIZINE HCL 12.5 MG/1
12.5-25 TABLET ORAL 2 TIMES DAILY PRN
Qty: 60 TABLET | Refills: 2 | Status: SHIPPED | OUTPATIENT
Start: 2022-12-12

## 2022-12-12 RX ORDER — CHOLECALCIFEROL (VITAMIN D3) 25 MCG
1 TABLET,CHEWABLE ORAL DAILY
Qty: 90 CAPSULE | Refills: 2 | Status: ON HOLD | OUTPATIENT
Start: 2022-12-12 | End: 2023-03-30

## 2022-12-12 RX ORDER — TIOTROPIUM BROMIDE INHALATION SPRAY 1.56 UG/1
2 SPRAY, METERED RESPIRATORY (INHALATION) DAILY PRN
Qty: 4 G | Refills: 4 | Status: SHIPPED | OUTPATIENT
Start: 2022-12-12

## 2022-12-13 LAB
25(OH)D3+25(OH)D2 SERPL-MCNC: 47.2 NG/ML (ref 30–100)
ALBUMIN SERPL-MCNC: 4.4 G/DL (ref 3.8–4.9)
ALBUMIN/GLOB SERPL: 1.6 {RATIO} (ref 1.2–2.2)
ALP SERPL-CCNC: 77 IU/L (ref 44–121)
ALT SERPL-CCNC: 22 IU/L (ref 0–32)
AST SERPL-CCNC: 21 IU/L (ref 0–40)
BILIRUB SERPL-MCNC: 0.6 MG/DL (ref 0–1.2)
BUN SERPL-MCNC: 11 MG/DL (ref 6–24)
BUN/CREAT SERPL: 11 (ref 9–23)
CALCIUM SERPL-MCNC: 10 MG/DL (ref 8.7–10.2)
CHLORIDE SERPL-SCNC: 100 MMOL/L (ref 96–106)
CHOLEST SERPL-MCNC: 205 MG/DL (ref 100–199)
CO2 SERPL-SCNC: 26 MMOL/L (ref 20–29)
CREAT SERPL-MCNC: 0.99 MG/DL (ref 0.57–1)
EGFRCR SERPLBLD CKD-EPI 2021: 67 ML/MIN/1.73
GLOBULIN SER CALC-MCNC: 2.7 G/DL (ref 1.5–4.5)
GLUCOSE SERPL-MCNC: 93 MG/DL (ref 70–99)
HCV AB S/CO SERPL IA: 0.1 S/CO RATIO (ref 0–0.9)
HDLC SERPL-MCNC: 54 MG/DL
LDLC SERPL CALC-MCNC: 121 MG/DL (ref 0–99)
POTASSIUM SERPL-SCNC: 4.2 MMOL/L (ref 3.5–5.2)
PROT SERPL-MCNC: 7.1 G/DL (ref 6–8.5)
SODIUM SERPL-SCNC: 140 MMOL/L (ref 134–144)
TRIGL SERPL-MCNC: 173 MG/DL (ref 0–149)
VLDLC SERPL CALC-MCNC: 30 MG/DL (ref 5–40)

## 2023-01-11 ENCOUNTER — OFFICE VISIT (OUTPATIENT)
Dept: FAMILY MEDICINE CLINIC | Facility: CLINIC | Age: 58
End: 2023-01-11
Payer: COMMERCIAL

## 2023-01-11 VITALS
OXYGEN SATURATION: 98 % | HEART RATE: 82 BPM | DIASTOLIC BLOOD PRESSURE: 76 MMHG | WEIGHT: 237.8 LBS | BODY MASS INDEX: 42.13 KG/M2 | SYSTOLIC BLOOD PRESSURE: 110 MMHG | HEIGHT: 63 IN | TEMPERATURE: 98.6 F

## 2023-01-11 DIAGNOSIS — Z76.89 ENCOUNTER FOR WEIGHT MANAGEMENT: Primary | ICD-10-CM

## 2023-01-11 DIAGNOSIS — F41.1 GAD (GENERALIZED ANXIETY DISORDER): ICD-10-CM

## 2023-01-11 DIAGNOSIS — R00.2 HEART PALPITATIONS: ICD-10-CM

## 2023-01-11 PROCEDURE — 93000 ELECTROCARDIOGRAM COMPLETE: CPT | Performed by: NURSE PRACTITIONER

## 2023-01-11 PROCEDURE — 99214 OFFICE O/P EST MOD 30 MIN: CPT | Performed by: NURSE PRACTITIONER

## 2023-01-11 RX ORDER — HYDROXYZINE HYDROCHLORIDE 10 MG/1
10 TABLET, FILM COATED ORAL 2 TIMES DAILY PRN
Qty: 30 TABLET | Refills: 1 | Status: SHIPPED | OUTPATIENT
Start: 2023-01-11

## 2023-01-11 RX ORDER — SPIRONOLACTONE AND HYDROCHLOROTHIAZIDE 25; 25 MG/1; MG/1
TABLET ORAL
COMMUNITY
Start: 2022-12-10

## 2023-01-11 NOTE — PROGRESS NOTES
"Chief Complaint  Weight Check    Subjective        Lizy Bryan presents to [facility]  History of Present Illness   57-year-old female presenting for follow-up weight management, she is on Saxenda 3 mg daily, she is tolerating medication well and continues with low calorie diet,  since her last visit, she is limited on exercising related to chronic neck and back pain, since her last visit on 12/7/2022 she has had a 4 pound weight loss, with overall weight loss of 13 pounds since starting Saxenda on 10/3/2022.  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  Her symptoms may be related to anxiety but we will go ahead and obtain EKG, if normal will order Holter monitor and place referral to cardiology.  I will also start hydroxyzine 10 mg twice daily as needed anxiety.      Objective   Vital Signs:  /76 (BP Location: Left arm, Patient Position: Sitting, Cuff Size: Large Adult)   Pulse 82   Temp 98.6 °F (37 °C) (Infrared)   Ht 160 cm (63\")   Wt 108 kg (237 lb 12.8 oz)   SpO2 98%   BMI 42.12 kg/m²   Estimated body mass index is 42.12 kg/m² as calculated from the following:    Height as of this encounter: 160 cm (63\").    Weight as of this encounter: 108 kg (237 lb 12.8 oz).       Class 3 Severe Obesity (BMI >=40). Obesity-related health conditions include the following: hypertension. Obesity is improving with treatment. BMI is is above average; BMI management plan is completed. We discussed portion control and increasing exercise.      Physical Exam  Cardiovascular:      Rate and Rhythm: Normal rate and regular rhythm.      Pulses: Normal pulses.      Heart sounds: Normal heart sounds.   Pulmonary:      Effort: Pulmonary effort is normal.      Breath sounds: Normal breath sounds. "   Abdominal:      General: Bowel sounds are normal.      Palpations: Abdomen is soft.   Neurological:      General: No focal deficit present.      Mental Status: She is alert and oriented to person, place, and time.   Psychiatric:         Mood and Affect: Mood normal.         Behavior: Behavior normal.         Thought Content: Thought content normal.         Judgment: Judgment normal.        Result Review :              ECG 12 Lead    Date/Time: 1/11/2023 8:46 AM  Performed by: Stefani Kuhn APRN  Authorized by: Stefani Kuhn APRN   Comparison: compared with previous ECG from 7/12/2021  Similar to previous ECG  Rhythm: sinus rhythm  Other findings: T wave abnormality  Comments: Will order Holter Monitor.               Assessment and Plan   Diagnoses and all orders for this visit:    1. Encounter for weight management (Primary)  Comments:  Continue Saxenda 3 mg daily along with calorie counting.    2. RANDY (generalized anxiety disorder)  -     hydrOXYzine (ATARAX) 10 MG tablet; Take 1 tablet by mouth 2 (Two) Times a Day As Needed for Anxiety.  Dispense: 30 tablet; Refill: 1    3. Heart palpitations  -     ECG 12 Lead  -     Cancel: Holter Monitor - 72 Hour Up To 15 Days; Future  -     Holter Monitor - 72 Hour Up To 15 Days  -     Ambulatory Referral to Cardiology           I spent 30 minutes caring for Lizy on this date of service. This time includes time spent by me in the following activities:reviewing tests, obtaining and/or reviewing a separately obtained history, performing a medically appropriate examination and/or evaluation , counseling and educating the patient/family/caregiver, ordering medications, tests, or procedures, referring and communicating with other health care professionals , documenting information in the medical record and independently interpreting results and communicating that information with the patient/family/caregiver  Follow Up   Return if symptoms worsen or fail to  improve.  Patient was given instructions and counseling regarding her condition or for health maintenance advice. Please see specific information pulled into the AVS if appropriate.     Continue Saxenda 3 ml daily, along with 3664-8716 calories a day and exercise regimen. Use of ice/heat therapy for neck and back pain along with stretching exercises. Follow-up in 3 months and come fasting to appointment.     Mask and gloves worn    Answers for HPI/ROS submitted by the patient on 1/8/2023  Please describe your symptoms.: Weight loss follow up  Have you had these symptoms before?: Yes  How long have you been having these symptoms?: Greater than 2 weeks  Please list any medications you are currently taking for this condition.: Sandexa  Please describe any probable cause for these symptoms. : Food  What is the primary reason for your visit?: Other

## 2023-01-11 NOTE — PATIENT INSTRUCTIONS
Continue Saxenda 3 ml daily, along with 9951-9651 calories a day and exercise regimen. Use of ice/heat therapy for neck and back pain along with stretching exercises. Follow-up in 3 months and come fasting to appointment.

## 2023-01-16 ENCOUNTER — PRIOR AUTHORIZATION (OUTPATIENT)
Dept: FAMILY MEDICINE CLINIC | Facility: CLINIC | Age: 58
End: 2023-01-16
Payer: COMMERCIAL

## 2023-01-19 ENCOUNTER — HOSPITAL ENCOUNTER (OUTPATIENT)
Dept: CARDIOLOGY | Facility: HOSPITAL | Age: 58
Discharge: HOME | End: 2023-01-19
Admitting: NURSE PRACTITIONER
Payer: COMMERCIAL

## 2023-01-19 PROCEDURE — 93246 EXT ECG>7D<15D RECORDING: CPT

## 2023-02-04 LAB
MAXIMAL PREDICTED HEART RATE: 163 BPM
STRESS TARGET HR: 139 BPM

## 2023-02-04 PROCEDURE — 93248 EXT ECG>7D<15D REV&INTERPJ: CPT | Performed by: INTERNAL MEDICINE

## 2023-02-13 ENCOUNTER — OFFICE VISIT (OUTPATIENT)
Dept: CARDIOLOGY | Facility: CLINIC | Age: 58
End: 2023-02-13
Payer: COMMERCIAL

## 2023-02-13 VITALS
HEART RATE: 69 BPM | DIASTOLIC BLOOD PRESSURE: 90 MMHG | SYSTOLIC BLOOD PRESSURE: 130 MMHG | HEIGHT: 63 IN | BODY MASS INDEX: 44.3 KG/M2 | WEIGHT: 250 LBS

## 2023-02-13 DIAGNOSIS — R00.2 PALPITATIONS: Primary | ICD-10-CM

## 2023-02-13 DIAGNOSIS — R07.89 OTHER CHEST PAIN: ICD-10-CM

## 2023-02-13 DIAGNOSIS — R06.02 SOB (SHORTNESS OF BREATH): ICD-10-CM

## 2023-02-13 PROCEDURE — 99214 OFFICE O/P EST MOD 30 MIN: CPT | Performed by: INTERNAL MEDICINE

## 2023-02-13 PROCEDURE — 93000 ELECTROCARDIOGRAM COMPLETE: CPT | Performed by: INTERNAL MEDICINE

## 2023-02-13 NOTE — PROGRESS NOTES
Subjective:     Encounter Date:02/13/23      Patient ID: Lizy Bryan is a 57 y.o. female.    Chief Complaint:  History of Present Illness    Dear Stefani,    I had the pleasure of seeing this patient in the office today for initial evaluation and consultation.  I appreciate that you sent her in to see us.  They come in today to be seen for evaluation of palpitations as well as chest pain and shortness of breath.    Patient's been intermittently having palpitations.  This is a single hard beat are sometimes several heartbeats.  No racing.  No associated symptoms.  She had not had any dizziness or lightheadedness no presyncope or syncope.  She does not do any regular exercise right now but has not noticed this being associated with any particular activity.    She also has been noticing occasional mid precordial chest discomfort.  Has not associate with any particular activity.  No radiation.  No associated nausea, vomiting, or diaphoresis.    Also, the patient states that sometimes in the morning when she wakes up she feels some shortness of breath.  She has a history of sleep apnea and is on CPAP.    She has a history of hypertension and is on Aldactazide for that.    We arrange for an event monitor, and this demonstrated that her palpitations correlated with PACs.    The following portions of the patient's history were reviewed and updated as appropriate: allergies, current medications, past family history, past medical history, past social history, past surgical history and problem list.      ECG 12 Lead    Date/Time: 2/13/2023 9:29 AM  Performed by: Pablo Gregory III, MD  Authorized by: Pablo Gregory III, MD   Comparison: compared with previous ECG   Similar to previous ECG  Rhythm: sinus rhythm  Rate: normal  Conduction: conduction normal  QRS axis: normal  Other findings: non-specific ST-T wave changes    Clinical impression: normal ECG               Objective:     Vitals:    02/13/23 0859   BP:  "130/90   BP Location: Left arm   Patient Position: Sitting   Pulse: 69   Weight: 113 kg (250 lb)   Height: 160 cm (63\")     Body mass index is 44.29 kg/m².      Vitals reviewed.   Constitutional:       General: Not in acute distress.     Appearance: Well-developed. Not diaphoretic.   Eyes:      General:         Right eye: No discharge.         Left eye: No discharge.      Conjunctiva/sclera: Conjunctivae normal.      Pupils: Pupils are equal, round, and reactive to light.   HENT:      Head: Normocephalic and atraumatic.      Nose: Nose normal.   Neck:      Thyroid: No thyromegaly.      Trachea: No tracheal deviation.      Lymphadenopathy: No cervical adenopathy.   Pulmonary:      Effort: Pulmonary effort is normal. No respiratory distress.      Breath sounds: Normal breath sounds. No stridor.   Chest:      Chest wall: Not tender to palpatation.   Cardiovascular:      Normal rate. Regular rhythm.      Murmurs: There is no murmur.      . No S3 gallop. No click. No rub.   Pulses:     Intact distal pulses.   Abdominal:      General: Bowel sounds are normal. There is no distension.      Palpations: Abdomen is soft. There is no abdominal mass.      Tenderness: There is no abdominal tenderness. There is no guarding or rebound.   Musculoskeletal: Normal range of motion.         General: No tenderness or deformity.      Cervical back: Normal range of motion and neck supple. Skin:     General: Skin is warm and dry.      Findings: No erythema or rash.   Neurological:      Mental Status: Alert and oriented to person, place, and time.      Deep Tendon Reflexes: Reflexes are normal and symmetric.   Psychiatric:         Thought Content: Thought content normal.         Data and records reviewed:     Lab Results   Component Value Date    GLUCOSE 93 12/12/2022    BUN 11 12/12/2022    CREATININE 0.99 12/12/2022    EGFRRESULT 67 12/12/2022    EGFR 81.7 07/07/2022    BCR 11 12/12/2022    K 4.2 12/12/2022    CO2 26 12/12/2022    CALCIUM " 10.0 12/12/2022    PROTENTOTREF 7.1 12/12/2022    ALBUMIN 4.4 12/12/2022    LABIL2 1.6 12/12/2022    AST 21 12/12/2022    ALT 22 12/12/2022     Lab Results   Component Value Date    CHOL 115 07/07/2022     Lab Results   Component Value Date    TRIG 173 (H) 12/12/2022    TRIG 188 (H) 07/07/2022     Lab Results   Component Value Date    HDL 54 12/12/2022    HDL 18 (L) 07/07/2022     Lab Results   Component Value Date     (H) 12/12/2022    LDL 65 07/07/2022     Lab Results   Component Value Date    VLDL 30 12/12/2022    VLDL 32 07/07/2022     Lab Results   Component Value Date    LDLHDL 3.30 07/07/2022     CBC    CBC 7/6/22 7/7/22   WBC 5.22 4.97   RBC 4.47 4.05   Hemoglobin 14.3 12.5   Hematocrit 39.9 37.6   MCV 89.3 92.8   MCH 32.0 30.9   MCHC 35.8 (A) 33.2   RDW 13.0 13.3   Platelets 205 207   (A) Abnormal value            No radiology results for the last 90 days.          Assessment:          Diagnosis Plan   1. Palpitations  Adult Transthoracic Echo Complete W/ Cont if Necessary Per Protocol    Stress Test With Myocardial Perfusion One Day      2. Other chest pain  Adult Transthoracic Echo Complete W/ Cont if Necessary Per Protocol    Stress Test With Myocardial Perfusion One Day      3. SOB (shortness of breath)  Adult Transthoracic Echo Complete W/ Cont if Necessary Per Protocol    Stress Test With Myocardial Perfusion One Day             Plan:       1.  Palpitations, correspond to PACs on her monitor, I reviewed the monitor results with her  2.  Chest discomfort, with both typical and atypical components.  Patient was seen by Dr. Hassan July 2021.  At that point she had a stress test ordered but she never showed up for it.  I concur that with the risk factor of symptoms that would be appropriate, patient states she cannot walk on a treadmill.  She did do that in 2015, that study was unremarkable, but she really struggled.  She says now she knows she just would not be able to do it.  We will  arrange for pharmacologic nuclear perfusion study  3.  Dyspnea, similarly she had an echo ordered July 2021 but never had it done, we will get that echo obtained to follow-up on her dyspnea at this point.    Thank you very much for allowing us to participate in the care of this pleasant patient.  Please don't hesitate to call if I can be of assistance in any way.      Current Outpatient Medications:   •  albuterol sulfate  (90 Base) MCG/ACT inhaler, Inhale 2 puffs Every 4 (Four) Hours As Needed for Wheezing or Shortness of Air., Disp: 18 g, Rfl: 4  •  cetirizine (zyrTEC) 10 MG tablet, Take 10 mg by mouth Every Evening., Disp: , Rfl:   •  cholecalciferol (VITAMIN D3) 250 MCG (87319 UT) capsule, Take 1 capsule by mouth Daily., Disp: 90 capsule, Rfl: 3  •  Cyanocobalamin (B-12) 1000 MCG capsule, Take 1 capsule by mouth Daily., Disp: 90 capsule, Rfl: 2  •  escitalopram (LEXAPRO) 10 MG tablet, Take 10 mg by mouth Daily., Disp: , Rfl:   •  ezetimibe (ZETIA) 10 MG tablet, Take 10 mg by mouth Daily., Disp: , Rfl:   •  hydrOXYzine (ATARAX) 10 MG tablet, Take 1 tablet by mouth 2 (Two) Times a Day As Needed for Anxiety., Disp: 30 tablet, Rfl: 1  •  Liraglutide (SAXENDA) 18 MG/3ML injection pen, Inject 3 mg under the skin into the appropriate area as directed Daily., Disp: 15 mL, Rfl: 5  •  meclizine (ANTIVERT) 12.5 MG tablet, Take 1-2 tablets by mouth 2 (Two) Times a Day As Needed for Dizziness., Disp: 60 tablet, Rfl: 2  •  Omega-3 Fatty Acids (FISH OIL PO), Take  by mouth., Disp: , Rfl:   •  Spiriva Respimat 1.25 MCG/ACT aerosol solution inhaler, Inhale 2 puffs Daily As Needed (Shortness of breath)., Disp: 4 g, Rfl: 4  •  spironolactone-hydrochlorothiazide (ALDACTAZIDE) 25-25 MG tablet, TAKE 1 TABLET BY MOUTH ONCE DAILY AFTER FINISH BUMETANIDE, Disp: , Rfl:   •  traZODone (DESYREL) 150 MG tablet, Take 150 mg by mouth Every Night., Disp: , Rfl:          No follow-ups on file.

## 2023-02-20 ENCOUNTER — HOSPITAL ENCOUNTER (OUTPATIENT)
Dept: GENERAL RADIOLOGY | Facility: HOSPITAL | Age: 58
Discharge: HOME | End: 2023-02-20
Payer: COMMERCIAL

## 2023-02-20 DIAGNOSIS — M54.42 ACUTE MIDLINE LOW BACK PAIN WITH LEFT-SIDED SCIATICA: ICD-10-CM

## 2023-02-20 DIAGNOSIS — M54.2 ACUTE NECK PAIN: ICD-10-CM

## 2023-02-20 PROCEDURE — 72110 X-RAY EXAM L-2 SPINE 4/>VWS: CPT

## 2023-02-20 PROCEDURE — 72050 X-RAY EXAM NECK SPINE 4/5VWS: CPT

## 2023-02-27 ENCOUNTER — TELEPHONE (OUTPATIENT)
Dept: PHYSICAL THERAPY | Facility: CLINIC | Age: 58
End: 2023-02-27

## 2023-02-27 RX ORDER — ESCITALOPRAM OXALATE 10 MG/1
10 TABLET ORAL DAILY
Qty: 90 TABLET | Refills: 0 | Status: SHIPPED | OUTPATIENT
Start: 2023-02-27

## 2023-02-28 ENCOUNTER — HOSPITAL ENCOUNTER (OUTPATIENT)
Dept: CARDIOLOGY | Facility: HOSPITAL | Age: 58
Discharge: HOME | End: 2023-02-28
Payer: COMMERCIAL

## 2023-02-28 ENCOUNTER — TELEPHONE (OUTPATIENT)
Dept: CARDIOLOGY | Facility: CLINIC | Age: 58
End: 2023-02-28
Payer: COMMERCIAL

## 2023-02-28 VITALS
OXYGEN SATURATION: 99 % | WEIGHT: 250 LBS | SYSTOLIC BLOOD PRESSURE: 143 MMHG | HEART RATE: 70 BPM | HEIGHT: 63 IN | BODY MASS INDEX: 44.3 KG/M2 | DIASTOLIC BLOOD PRESSURE: 78 MMHG

## 2023-02-28 DIAGNOSIS — R06.02 SOB (SHORTNESS OF BREATH): ICD-10-CM

## 2023-02-28 DIAGNOSIS — R07.89 OTHER CHEST PAIN: ICD-10-CM

## 2023-02-28 DIAGNOSIS — R00.2 PALPITATIONS: ICD-10-CM

## 2023-02-28 LAB
AORTIC ARCH: 2.5 CM
ASCENDING AORTA: 3.8 CM
BH CV ECHO MEAS - ACS: 2.03 CM
BH CV ECHO MEAS - AO MAX PG: 5.5 MMHG
BH CV ECHO MEAS - AO MEAN PG: 3.7 MMHG
BH CV ECHO MEAS - AO ROOT DIAM: 3.6 CM
BH CV ECHO MEAS - AO V2 MAX: 117.6 CM/SEC
BH CV ECHO MEAS - AO V2 VTI: 28 CM
BH CV ECHO MEAS - AVA(I,D): 3.3 CM2
BH CV ECHO MEAS - EDV(CUBED): 124.1 ML
BH CV ECHO MEAS - EDV(MOD-SP2): 83 ML
BH CV ECHO MEAS - EDV(MOD-SP4): 80 ML
BH CV ECHO MEAS - EF(MOD-BP): 53.9 %
BH CV ECHO MEAS - EF(MOD-SP2): 50.6 %
BH CV ECHO MEAS - EF(MOD-SP4): 58.8 %
BH CV ECHO MEAS - ESV(CUBED): 34.3 ML
BH CV ECHO MEAS - ESV(MOD-SP2): 41 ML
BH CV ECHO MEAS - ESV(MOD-SP4): 33 ML
BH CV ECHO MEAS - FS: 34.9 %
BH CV ECHO MEAS - IVS/LVPW: 1.05 CM
BH CV ECHO MEAS - IVSD: 1.05 CM
BH CV ECHO MEAS - LAT PEAK E' VEL: 8.5 CM/SEC
BH CV ECHO MEAS - LV DIASTOLIC VOL/BSA (35-75): 37.6 CM2
BH CV ECHO MEAS - LV MASS(C)D: 186.8 GRAMS
BH CV ECHO MEAS - LV MAX PG: 4.4 MMHG
BH CV ECHO MEAS - LV MEAN PG: 2.37 MMHG
BH CV ECHO MEAS - LV SYSTOLIC VOL/BSA (12-30): 15.5 CM2
BH CV ECHO MEAS - LV V1 MAX: 104.7 CM/SEC
BH CV ECHO MEAS - LV V1 VTI: 22.5 CM
BH CV ECHO MEAS - LVIDD: 5 CM
BH CV ECHO MEAS - LVIDS: 3.2 CM
BH CV ECHO MEAS - LVOT AREA: 4.1 CM2
BH CV ECHO MEAS - LVOT DIAM: 2.28 CM
BH CV ECHO MEAS - LVPWD: 1 CM
BH CV ECHO MEAS - MED PEAK E' VEL: 5.9 CM/SEC
BH CV ECHO MEAS - MV A DUR: 0.13 SEC
BH CV ECHO MEAS - MV A MAX VEL: 94.9 CM/SEC
BH CV ECHO MEAS - MV DEC SLOPE: 482.4 CM/SEC2
BH CV ECHO MEAS - MV DEC TIME: 0.2 MSEC
BH CV ECHO MEAS - MV E MAX VEL: 110 CM/SEC
BH CV ECHO MEAS - MV E/A: 1.16
BH CV ECHO MEAS - MV MAX PG: 4.7 MMHG
BH CV ECHO MEAS - MV MEAN PG: 2.6 MMHG
BH CV ECHO MEAS - MV P1/2T: 63.9 MSEC
BH CV ECHO MEAS - MV V2 VTI: 33.3 CM
BH CV ECHO MEAS - MVA(P1/2T): 3.4 CM2
BH CV ECHO MEAS - MVA(VTI): 2.8 CM2
BH CV ECHO MEAS - PA ACC TIME: 0.11 SEC
BH CV ECHO MEAS - PA PR(ACCEL): 31.5 MMHG
BH CV ECHO MEAS - PA V2 MAX: 81.4 CM/SEC
BH CV ECHO MEAS - PULM A REVS DUR: 0.13 SEC
BH CV ECHO MEAS - PULM A REVS VEL: 31.4 CM/SEC
BH CV ECHO MEAS - PULM DIAS VEL: 62.8 CM/SEC
BH CV ECHO MEAS - PULM S/D: 0.89
BH CV ECHO MEAS - PULM SYS VEL: 55.7 CM/SEC
BH CV ECHO MEAS - QP/QS: 0.52
BH CV ECHO MEAS - RAP SYSTOLE: 3 MMHG
BH CV ECHO MEAS - RV MAX PG: 1.26 MMHG
BH CV ECHO MEAS - RV V1 MAX: 56.1 CM/SEC
BH CV ECHO MEAS - RV V1 VTI: 12.5 CM
BH CV ECHO MEAS - RVOT DIAM: 2.22 CM
BH CV ECHO MEAS - RVSP: 28.4 MMHG
BH CV ECHO MEAS - SI(MOD-SP2): 19.8 ML/M2
BH CV ECHO MEAS - SI(MOD-SP4): 22.1 ML/M2
BH CV ECHO MEAS - SUP REN AO DIAM: 2.5 CM
BH CV ECHO MEAS - SV(LVOT): 92.2 ML
BH CV ECHO MEAS - SV(MOD-SP2): 42 ML
BH CV ECHO MEAS - SV(MOD-SP4): 47 ML
BH CV ECHO MEAS - SV(RVOT): 48.3 ML
BH CV ECHO MEAS - TAPSE (>1.6): 2.09 CM
BH CV ECHO MEAS - TR MAX PG: 25.4 MMHG
BH CV ECHO MEAS - TR MAX VEL: 251.9 CM/SEC
BH CV ECHO MEASUREMENTS AVERAGE E/E' RATIO: 15.28
BH CV NUCLEAR PRIOR STUDY: 2
BH CV REST NUCLEAR ISOTOPE DOSE: 11.2 MCI
BH CV STRESS BP STAGE 1: NORMAL
BH CV STRESS COMMENTS STAGE 1: NORMAL
BH CV STRESS DOSE REGADENOSON STAGE 1: 0.4
BH CV STRESS DURATION MIN STAGE 1: 0
BH CV STRESS DURATION SEC STAGE 1: 10
BH CV STRESS HR STAGE 1: 113
BH CV STRESS NUCLEAR ISOTOPE DOSE: 35.5 MCI
BH CV STRESS PROTOCOL 1: NORMAL
BH CV STRESS RECOVERY BP: NORMAL MMHG
BH CV STRESS RECOVERY HR: 82 BPM
BH CV STRESS STAGE 1: 1
BH CV XLRA - RV BASE: 3.6 CM
BH CV XLRA - RV LENGTH: 6.8 CM
BH CV XLRA - RV MID: 3.6 CM
BH CV XLRA - TDI S': 10 CM/SEC
LEFT ATRIUM VOLUME INDEX: 22.7 ML/M2
LV EF NUC BP: 65 %
MAXIMAL PREDICTED HEART RATE: 163 BPM
MAXIMAL PREDICTED HEART RATE: 163 BPM
PERCENT MAX PREDICTED HR: 69.33 %
SINUS: 3.6 CM
STJ: 3.5 CM
STRESS BASELINE BP: NORMAL MMHG
STRESS BASELINE HR: 79 BPM
STRESS PERCENT HR: 82 %
STRESS POST EXERCISE DUR SEC: 10 SEC
STRESS POST PEAK BP: NORMAL MMHG
STRESS POST PEAK HR: 113 BPM
STRESS TARGET HR: 139 BPM
STRESS TARGET HR: 139 BPM

## 2023-02-28 PROCEDURE — 93018 CV STRESS TEST I&R ONLY: CPT | Performed by: INTERNAL MEDICINE

## 2023-02-28 PROCEDURE — 78452 HT MUSCLE IMAGE SPECT MULT: CPT

## 2023-02-28 PROCEDURE — 25510000001 PERFLUTREN (DEFINITY) 8.476 MG IN SODIUM CHLORIDE (PF) 0.9 % 10 ML INJECTION: Performed by: INTERNAL MEDICINE

## 2023-02-28 PROCEDURE — 78452 HT MUSCLE IMAGE SPECT MULT: CPT | Performed by: INTERNAL MEDICINE

## 2023-02-28 PROCEDURE — 93306 TTE W/DOPPLER COMPLETE: CPT

## 2023-02-28 PROCEDURE — 0 TECHNETIUM TETROFOSMIN KIT: Performed by: INTERNAL MEDICINE

## 2023-02-28 PROCEDURE — 25010000002 REGADENOSON 0.4 MG/5ML SOLUTION: Performed by: INTERNAL MEDICINE

## 2023-02-28 PROCEDURE — A9502 TC99M TETROFOSMIN: HCPCS | Performed by: INTERNAL MEDICINE

## 2023-02-28 PROCEDURE — 93017 CV STRESS TEST TRACING ONLY: CPT

## 2023-02-28 PROCEDURE — 93016 CV STRESS TEST SUPVJ ONLY: CPT | Performed by: INTERNAL MEDICINE

## 2023-02-28 PROCEDURE — 93306 TTE W/DOPPLER COMPLETE: CPT | Performed by: INTERNAL MEDICINE

## 2023-02-28 RX ADMIN — PERFLUTREN 1.5 ML: 6.52 INJECTION, SUSPENSION INTRAVENOUS at 07:34

## 2023-02-28 RX ADMIN — REGADENOSON 0.4 MG: 0.08 INJECTION, SOLUTION INTRAVENOUS at 08:26

## 2023-02-28 RX ADMIN — TETROFOSMIN 1 DOSE: 1.38 INJECTION, POWDER, LYOPHILIZED, FOR SOLUTION INTRAVENOUS at 08:26

## 2023-02-28 RX ADMIN — TETROFOSMIN 1 DOSE: 1.38 INJECTION, POWDER, LYOPHILIZED, FOR SOLUTION INTRAVENOUS at 07:40

## 2023-03-01 ENCOUNTER — TELEPHONE (OUTPATIENT)
Dept: CARDIOLOGY | Facility: CLINIC | Age: 58
End: 2023-03-01
Payer: COMMERCIAL

## 2023-03-01 NOTE — TELEPHONE ENCOUNTER
----- Message from Pablo Gregory III, MD sent at 3/1/2023 12:59 PM EST -----  Please call- normal results

## 2023-03-07 ENCOUNTER — PREP FOR SURGERY (OUTPATIENT)
Dept: SURGERY | Facility: SURGERY CENTER | Age: 58
End: 2023-03-07
Payer: COMMERCIAL

## 2023-03-07 ENCOUNTER — OFFICE VISIT (OUTPATIENT)
Dept: GASTROENTEROLOGY | Facility: CLINIC | Age: 58
End: 2023-03-07
Payer: COMMERCIAL

## 2023-03-07 VITALS
HEART RATE: 72 BPM | OXYGEN SATURATION: 95 % | BODY MASS INDEX: 43.73 KG/M2 | DIASTOLIC BLOOD PRESSURE: 76 MMHG | WEIGHT: 246.8 LBS | HEIGHT: 63 IN | TEMPERATURE: 97.9 F | SYSTOLIC BLOOD PRESSURE: 130 MMHG

## 2023-03-07 DIAGNOSIS — K21.9 GASTROESOPHAGEAL REFLUX DISEASE, UNSPECIFIED WHETHER ESOPHAGITIS PRESENT: Primary | Chronic | ICD-10-CM

## 2023-03-07 DIAGNOSIS — R13.10 DYSPHAGIA, UNSPECIFIED TYPE: Chronic | ICD-10-CM

## 2023-03-07 DIAGNOSIS — Z12.11 COLON CANCER SCREENING: ICD-10-CM

## 2023-03-07 PROCEDURE — 99214 OFFICE O/P EST MOD 30 MIN: CPT | Performed by: NURSE PRACTITIONER

## 2023-03-07 RX ORDER — SODIUM CHLORIDE 0.9 % (FLUSH) 0.9 %
3 SYRINGE (ML) INJECTION EVERY 12 HOURS SCHEDULED
Status: CANCELLED | OUTPATIENT
Start: 2023-03-07

## 2023-03-07 RX ORDER — SODIUM CHLORIDE, SODIUM LACTATE, POTASSIUM CHLORIDE, CALCIUM CHLORIDE 600; 310; 30; 20 MG/100ML; MG/100ML; MG/100ML; MG/100ML
30 INJECTION, SOLUTION INTRAVENOUS CONTINUOUS PRN
Status: CANCELLED | OUTPATIENT
Start: 2023-03-07

## 2023-03-07 RX ORDER — SODIUM CHLORIDE 0.9 % (FLUSH) 0.9 %
10 SYRINGE (ML) INJECTION AS NEEDED
Status: CANCELLED | OUTPATIENT
Start: 2023-03-07

## 2023-03-07 RX ORDER — NALTREXONE HYDROCHLORIDE AND BUPROPION HYDROCHLORIDE 8; 90 MG/1; MG/1
1 TABLET, EXTENDED RELEASE ORAL
Status: ON HOLD | COMMUNITY
End: 2023-03-30

## 2023-03-07 NOTE — H&P (VIEW-ONLY)
"Chief Complaint   Patient presents with   • Difficulty Swallowing   • Heartburn   • Colon Cancer Screening         History of Present Illness  57-year-old female presents the office today for evaluation of GERD and Hou's esophagus. She takes Zantac as needed. She has a history of dysphagia off/on. She had esopphageal dilation in the past with good relief. She reports her father had this same issue with benign esophageal narrowing. She does not have to regurgitate. Symptoms occur about once every couple of weeks. She denies any nausea or vomiting. She does reports intermittent belching.  She is unsure if she has an exact diagnosis of Hou's esophagus, but the name seems familiar to her.  It has been quite sometime since she has had an EGD.  She does report a history of esophageal cancer in a first cousin.    She reports having regular BMs. She denies any family history of colon cancer. She denies any melena or hematochezia. She was told that she was to have a colonoscopy last year, but due to her 's death she did not have this testing completed.  She is unsure of her history of colon polyps    Review of Systems   Constitutional: Negative for fever and unexpected weight change.   HENT: Positive for trouble swallowing.    Cardiovascular: Negative for chest pain.   Gastrointestinal: Negative for abdominal distention, abdominal pain, anal bleeding, blood in stool, constipation, diarrhea, nausea, rectal pain and vomiting.         Result Review :       Vitamin D 25 hydroxy (12/12/2022 09:15)  Lipid Panel (12/12/2022 09:15)  Comprehensive Metabolic Panel (12/12/2022 09:15)      Vital Signs:   /76   Pulse 72   Temp 97.9 °F (36.6 °C)   Ht 160 cm (63\")   Wt 112 kg (246 lb 12.8 oz)   SpO2 95%   BMI 43.72 kg/m²     Body mass index is 43.72 kg/m².     Physical Exam  Vitals reviewed.   Constitutional:       Appearance: Normal appearance.   HENT:      Head: Normocephalic.      Nose: Nose normal.      " Mouth/Throat:      Mouth: Mucous membranes are moist.   Eyes:      General: No scleral icterus.     Extraocular Movements: Extraocular movements intact.   Cardiovascular:      Rate and Rhythm: Normal rate and regular rhythm.      Pulses: Normal pulses.      Heart sounds: Normal heart sounds.   Pulmonary:      Effort: Pulmonary effort is normal. No respiratory distress.      Breath sounds: Normal breath sounds.   Abdominal:      General: Abdomen is flat. Bowel sounds are normal. There is no distension.      Palpations: Abdomen is soft. There is no mass.      Tenderness: There is no abdominal tenderness. There is no guarding.   Musculoskeletal:         General: Normal range of motion.      Cervical back: Normal range of motion and neck supple.   Skin:     General: Skin is warm and dry.   Neurological:      General: No focal deficit present.      Mental Status: She is alert and oriented to person, place, and time.   Psychiatric:         Mood and Affect: Mood normal.         Behavior: Behavior normal.         Thought Content: Thought content normal.         Judgment: Judgment normal.       Assessment and Plan    Diagnoses and all orders for this visit:    1. Gastroesophageal reflux disease, unspecified whether esophagitis present (Primary)  Comments:  Possible history of Hou's esophagus, patient not completely for certain    2. Dysphagia, unspecified type    3. Colon cancer screening           Patient Instructions   1. For dysphagia and GERD we will schedule an EGD with possible esophageal dilation.     2.  For intermittent heartburn we recommend that you discontinue use of Zantac OTC and switch to Pepcid (famotidine) 20 mg daily as needed.,     3.   For colon cancer screening and due to your history of colon polyps we will schedule a colonoscopy.     4.  Plan for office follow up 4 weeks after procedures to discuss results and reassess symptoms.      Discussion:      We have recommended the patient discontinue use  of Zantac, and due to some issues with some formulations containing NMDA.  We will have her replace this with Pepcid 20 mg over-the-counter as needed.  We will plan to proceed with EGD with possible esophageal dilation, as patient has had some benign esophageal stenosis in the past and has responded well to dilation.  She also reports a family history of this diagnosis in her father.    For colon cancer screening we will proceed with colonoscopy.  Plan for office follow-up 4 weeks after procedures to discuss results, reassess symptoms, and adjust plan of care accordingly.  Patient verbalized understanding of above plan of care and is in agreement.  All questions answered and support provided.    EMR Dragon/Transcription Disclaimer:  This document has been Dictated utilizing Dragon dictation.

## 2023-03-07 NOTE — PATIENT INSTRUCTIONS
For dysphagia and GERD we will schedule an EGD with possible esophageal dilation.     2.  For intermittent heartburn we recommend that you discontinue use of Zantac OTC and switch to Pepcid (famotidine) 20 mg daily as needed.,     3.   For colon cancer screening and due to your history of colon polyps we will schedule a colonoscopy.     4.  Plan for office follow up 4 weeks after procedures to discuss results and reassess symptoms.

## 2023-03-09 ENCOUNTER — TELEPHONE (OUTPATIENT)
Dept: PHYSICAL THERAPY | Facility: CLINIC | Age: 58
End: 2023-03-09

## 2023-03-30 ENCOUNTER — HOSPITAL ENCOUNTER (OUTPATIENT)
Facility: SURGERY CENTER | Age: 58
Setting detail: HOSPITAL OUTPATIENT SURGERY
Discharge: HOME | End: 2023-03-30
Attending: INTERNAL MEDICINE | Admitting: INTERNAL MEDICINE
Payer: COMMERCIAL

## 2023-03-30 ENCOUNTER — ANESTHESIA (OUTPATIENT)
Dept: SURGERY | Facility: SURGERY CENTER | Age: 58
End: 2023-03-30
Payer: COMMERCIAL

## 2023-03-30 ENCOUNTER — ANESTHESIA EVENT (OUTPATIENT)
Dept: SURGERY | Facility: SURGERY CENTER | Age: 58
End: 2023-03-30
Payer: COMMERCIAL

## 2023-03-30 VITALS
TEMPERATURE: 98.2 F | RESPIRATION RATE: 16 BRPM | HEART RATE: 82 BPM | SYSTOLIC BLOOD PRESSURE: 118 MMHG | OXYGEN SATURATION: 96 % | DIASTOLIC BLOOD PRESSURE: 81 MMHG

## 2023-03-30 DIAGNOSIS — Z12.11 COLON CANCER SCREENING: ICD-10-CM

## 2023-03-30 DIAGNOSIS — R13.10 DYSPHAGIA, UNSPECIFIED TYPE: ICD-10-CM

## 2023-03-30 DIAGNOSIS — K21.9 GASTROESOPHAGEAL REFLUX DISEASE, UNSPECIFIED WHETHER ESOPHAGITIS PRESENT: ICD-10-CM

## 2023-03-30 PROCEDURE — 45385 COLONOSCOPY W/LESION REMOVAL: CPT | Performed by: INTERNAL MEDICINE

## 2023-03-30 PROCEDURE — 43239 EGD BIOPSY SINGLE/MULTIPLE: CPT | Performed by: INTERNAL MEDICINE

## 2023-03-30 PROCEDURE — 43249 ESOPH EGD DILATION <30 MM: CPT | Performed by: INTERNAL MEDICINE

## 2023-03-30 PROCEDURE — 25010000002 PROPOFOL 10 MG/ML EMULSION: Performed by: ANESTHESIOLOGY

## 2023-03-30 PROCEDURE — 45380 COLONOSCOPY AND BIOPSY: CPT | Performed by: INTERNAL MEDICINE

## 2023-03-30 PROCEDURE — 0 LIDOCAINE 1 % SOLUTION: Performed by: ANESTHESIOLOGY

## 2023-03-30 PROCEDURE — C1726 CATH, BAL DIL, NON-VASCULAR: HCPCS | Performed by: INTERNAL MEDICINE

## 2023-03-30 PROCEDURE — 88305 TISSUE EXAM BY PATHOLOGIST: CPT | Performed by: INTERNAL MEDICINE

## 2023-03-30 RX ORDER — MAGNESIUM HYDROXIDE 1200 MG/15ML
LIQUID ORAL AS NEEDED
Status: DISCONTINUED | OUTPATIENT
Start: 2023-03-30 | End: 2023-03-30 | Stop reason: HOSPADM

## 2023-03-30 RX ORDER — LIDOCAINE HYDROCHLORIDE 10 MG/ML
INJECTION, SOLUTION INFILTRATION; PERINEURAL AS NEEDED
Status: DISCONTINUED | OUTPATIENT
Start: 2023-03-30 | End: 2023-03-30 | Stop reason: SURG

## 2023-03-30 RX ORDER — SODIUM CHLORIDE 0.9 % (FLUSH) 0.9 %
10 SYRINGE (ML) INJECTION AS NEEDED
Status: DISCONTINUED | OUTPATIENT
Start: 2023-03-30 | End: 2023-03-30 | Stop reason: HOSPADM

## 2023-03-30 RX ORDER — SODIUM CHLORIDE 0.9 % (FLUSH) 0.9 %
3 SYRINGE (ML) INJECTION EVERY 12 HOURS SCHEDULED
Status: DISCONTINUED | OUTPATIENT
Start: 2023-03-30 | End: 2023-03-30 | Stop reason: HOSPADM

## 2023-03-30 RX ORDER — LIDOCAINE HYDROCHLORIDE 10 MG/ML
0.5 INJECTION, SOLUTION INFILTRATION; PERINEURAL ONCE AS NEEDED
Status: DISCONTINUED | OUTPATIENT
Start: 2023-03-30 | End: 2023-03-30 | Stop reason: HOSPADM

## 2023-03-30 RX ORDER — SODIUM CHLORIDE, SODIUM LACTATE, POTASSIUM CHLORIDE, CALCIUM CHLORIDE 600; 310; 30; 20 MG/100ML; MG/100ML; MG/100ML; MG/100ML
1000 INJECTION, SOLUTION INTRAVENOUS CONTINUOUS
Status: DISCONTINUED | OUTPATIENT
Start: 2023-03-30 | End: 2023-03-30 | Stop reason: HOSPADM

## 2023-03-30 RX ORDER — PROPOFOL 10 MG/ML
VIAL (ML) INTRAVENOUS AS NEEDED
Status: DISCONTINUED | OUTPATIENT
Start: 2023-03-30 | End: 2023-03-30 | Stop reason: SURG

## 2023-03-30 RX ORDER — SODIUM CHLORIDE, SODIUM LACTATE, POTASSIUM CHLORIDE, CALCIUM CHLORIDE 600; 310; 30; 20 MG/100ML; MG/100ML; MG/100ML; MG/100ML
30 INJECTION, SOLUTION INTRAVENOUS CONTINUOUS PRN
Status: DISCONTINUED | OUTPATIENT
Start: 2023-03-30 | End: 2023-03-30 | Stop reason: HOSPADM

## 2023-03-30 RX ADMIN — SODIUM CHLORIDE, POTASSIUM CHLORIDE, SODIUM LACTATE AND CALCIUM CHLORIDE 1000 ML: 600; 310; 30; 20 INJECTION, SOLUTION INTRAVENOUS at 13:46

## 2023-03-30 RX ADMIN — LIDOCAINE HYDROCHLORIDE 30 MG: 10 INJECTION, SOLUTION INFILTRATION; PERINEURAL at 14:28

## 2023-03-30 RX ADMIN — PROPOFOL 140 MCG/KG/MIN: 10 INJECTION, EMULSION INTRAVENOUS at 14:28

## 2023-03-30 RX ADMIN — PROPOFOL 100 MG: 10 INJECTION, EMULSION INTRAVENOUS at 14:28

## 2023-03-30 RX ADMIN — PROPOFOL 50 MG: 10 INJECTION, EMULSION INTRAVENOUS at 14:31

## 2023-03-30 NOTE — ANESTHESIA POSTPROCEDURE EVALUATION
Patient: Lizy Bryan    Procedure Summary     Date: 03/30/23 Room / Location: [facility]    Anesthesia Start: 1424 Anesthesia Stop: 1507    Procedures:       ESOPHAGOGASTRODUODENOSCOPY with dilation to 20       COLONOSCOPY Diagnosis:       Gastroesophageal reflux disease, unspecified whether esophagitis present      Colon cancer screening      Dysphagia, unspecified type      (Gastroesophageal reflux disease, unspecified whether esophagitis present [K21.9])      (Colon cancer screening [Z12.11])      (Dysphagia, unspecified type [R13.10])    Surgeons: Figueroa Patterson MD Provider: Jarocho Banks MD    Anesthesia Type: MAC ASA Status: 3          Anesthesia Type: MAC    Vitals  No vitals data found for the desired time range.          Post Anesthesia Care and Evaluation    Patient location during evaluation: bedside  Patient participation: complete - patient participated  Level of consciousness: awake and alert  Pain management: adequate    Airway patency: patent  Anesthetic complications: No anesthetic complications  PONV Status: controlled  Cardiovascular status: acceptable  Respiratory status: acceptable  Hydration status: acceptable    Comments: /96 (BP Location: Left arm, Patient Position: Sitting)   Pulse 84   Temp 36.4 °C (97.5 °F) (Temporal)   Resp 16   SpO2 97%

## 2023-03-30 NOTE — ANESTHESIA PREPROCEDURE EVALUATION
" Anesthesia Evaluation     Patient summary reviewed and Nursing notes reviewed   NPO Solid Status: > 8 hours  NPO Liquid Status: > 2 hours           Airway   Mallampati: II  TM distance: >3 FB  Neck ROM: full  No difficulty expected  Dental - normal exam   (+) edentulous    Pulmonary - normal exam   (+) COPD, sleep apnea on CPAP,   Cardiovascular - normal exam  Exercise tolerance: good (4-7 METS)    (+) hypertension, hyperlipidemia,       Neuro/Psych  (+) psychiatric history Depression and Anxiety,    GI/Hepatic/Renal/Endo    (+) morbid obesity, GERD,  diabetes mellitus type 2,     Musculoskeletal (-) negative ROS    Abdominal    Substance History - negative use     OB/GYN          Other            Phys Exam Other: 4 lower \"studs\" for dentures                Anesthesia Plan    ASA 3     MAC     intravenous induction     Anesthetic plan, risks, benefits, and alternatives have been provided, discussed and informed consent has been obtained with: patient.        CODE STATUS:       "

## 2023-04-03 LAB
LAB AP CASE REPORT: NORMAL
LAB AP CLINICAL INFORMATION: NORMAL
PATH REPORT.FINAL DX SPEC: NORMAL
PATH REPORT.GROSS SPEC: NORMAL

## 2023-04-27 ENCOUNTER — OFFICE VISIT (OUTPATIENT)
Dept: GASTROENTEROLOGY | Facility: CLINIC | Age: 58
End: 2023-04-27
Payer: COMMERCIAL

## 2023-04-27 VITALS
HEIGHT: 63 IN | WEIGHT: 254.6 LBS | TEMPERATURE: 97.1 F | BODY MASS INDEX: 45.11 KG/M2 | DIASTOLIC BLOOD PRESSURE: 60 MMHG | HEART RATE: 78 BPM | OXYGEN SATURATION: 98 % | SYSTOLIC BLOOD PRESSURE: 100 MMHG

## 2023-04-27 DIAGNOSIS — K44.9 HIATAL HERNIA: Chronic | ICD-10-CM

## 2023-04-27 DIAGNOSIS — K22.2 SCHATZKI'S RING OF DISTAL ESOPHAGUS: Chronic | ICD-10-CM

## 2023-04-27 DIAGNOSIS — K21.9 GASTROESOPHAGEAL REFLUX DISEASE, UNSPECIFIED WHETHER ESOPHAGITIS PRESENT: Primary | Chronic | ICD-10-CM

## 2023-04-27 DIAGNOSIS — Z12.11 COLON CANCER SCREENING: ICD-10-CM

## 2023-04-27 DIAGNOSIS — Z86.010 HX OF ADENOMATOUS COLONIC POLYPS: ICD-10-CM

## 2023-04-27 PROCEDURE — 99214 OFFICE O/P EST MOD 30 MIN: CPT | Performed by: NURSE PRACTITIONER

## 2023-04-27 RX ORDER — PANTOPRAZOLE SODIUM 40 MG/1
40 TABLET, DELAYED RELEASE ORAL DAILY
Qty: 90 TABLET | Refills: 3 | Status: SHIPPED | OUTPATIENT
Start: 2023-04-27

## 2023-04-27 NOTE — PATIENT INSTRUCTIONS
For GERD and Hou's esophagus we will start you on pantoprazole 40 mg once daily. We recommend that you take this medication 1-2 hours before your evening meal. This prescription has been sent to your pharmacy.     2.   For surveillance of Hou's esophagus, you are in recall for your next EGD in 3 yrs.     3.   Due to your history of colon polyps you are in recall for  your next colonoscopy in 3 yrs.     4.  Recommend annual office follow up for reassessment of symptoms and medication refills or sooner should symptoms worsen/recur.

## 2023-04-27 NOTE — PROGRESS NOTES
Chief Complaint   Patient presents with   • Follow-up     Follow-up after EGD and colonoscopy; GERD, Hou's, colon polyps         History of Present Illness  57-year-old female presents the office today for follow-up.  She was last seen in office on 3/7/2023.  She has a history of Hou's esophagus, GERD, and dysphagia.  She underwent EGD and colonoscopy on 3/30/2023.  EGD demonstrated a 1 cm hiatal hernia, Schatzki's ring that was traversed and dilated with a balloon dilator, and an irregular Z-line. Next EGD due in 3 years. Colonoscopy revealed a total of 6 colon polyps, which were a mix between tubular adenomas, tubulovillous adenoma, and mild superficial hyperplastic polyp.  Patient was recommended undergo next surveillance colonoscopy in 3 years, which will be due March 2026.    She is not currently taking ane medication for reflux or Hou's esophagus. She cannot eat tomato based sauces, spicy foods, or chocolate due to heartburn. She has to use TUMS on an as needed basis with some relief. She reports symptoms 3-4 days per week. Symptoms occur more often in the evening. Swallowing is improved following dilation. She denies any nausea or vomiting.     She did discuss the option of bariatric surgery.  She very much wants to get on some sort of a weight loss plan.  She is undecided whether or not to proceed with surgery or to try conventional methods of diet and exercise.    She reports having regular daily BMs. She denies any rectal bleeding or abdominal pain. She denies any family history of colon cancer.     Review of Systems   Constitutional: Negative for fever and unexpected weight change.   HENT: Negative for trouble swallowing.    Cardiovascular: Negative for chest pain.   Gastrointestinal: Negative for abdominal distention, abdominal pain, anal bleeding, blood in stool, constipation, diarrhea, nausea, rectal pain and vomiting.      Result Review :       Office Visit with Minna Perea APRN  "(03/07/2023)  UPPER GI ENDOSCOPY (03/30/2023 14:19)  COLONOSCOPY (03/30/2023 14:14)  Tissue Pathology Exam (03/30/2023 14:32)    Vital Signs:   /60   Pulse 78   Temp 97.1 °F (36.2 °C)   Ht 160 cm (62.99\")   Wt 115 kg (254 lb 9.6 oz)   SpO2 98%   BMI 45.11 kg/m²     Body mass index is 45.11 kg/m².     Physical Exam  Vitals reviewed.   Constitutional:       Appearance: Normal appearance.   Pulmonary:      Effort: Pulmonary effort is normal. No respiratory distress.   Abdominal:      General: Abdomen is flat. Bowel sounds are normal. There is no distension.      Palpations: Abdomen is soft. There is no mass.      Tenderness: There is no abdominal tenderness. There is no guarding.   Musculoskeletal:         General: Normal range of motion.   Skin:     General: Skin is warm and dry.   Neurological:      General: No focal deficit present.      Mental Status: She is alert and oriented to person, place, and time.   Psychiatric:         Mood and Affect: Mood normal.         Behavior: Behavior normal.         Thought Content: Thought content normal.         Judgment: Judgment normal.       Assessment and Plan    Diagnoses and all orders for this visit:    1. Gastroesophageal reflux disease, unspecified whether esophagitis present (Primary)    2. Schatzki's ring of distal esophagus    3. Hiatal hernia  Comments:  1 cm    4. Hx of adenomatous colonic polyps    5. Colon cancer screening    Other orders  -     pantoprazole (PROTONIX) 40 MG EC tablet; Take 1 tablet by mouth Daily.  Dispense: 90 tablet; Refill: 3          Patient Instructions   1. For GERD and Hou's esophagus we will start you on pantoprazole 40 mg once daily. We recommend that you take this medication 1-2 hours before your evening meal. This prescription has been sent to your pharmacy.     2.   For surveillance of Hou's esophagus, you are in recall for your next EGD in 3 yrs.     3.   Due to your history of colon polyps you are in recall for  " your next colonoscopy in 3 yrs.     4.  Recommend annual office follow up for reassessment of symptoms and medication refills or sooner should symptoms worsen/recur.      Discussion:  EGD and colonoscopy findings and pathology reviewed with patient today during her follow-up visit.  She is not currently taking any acid suppressive Medication for Hou's, and we placed her on pantoprazole 40 mg once daily today.  Next surveillance EGD due in 3 years.  Next colonoscopy due in 3 years due to history of colon polyps.  We have recommended annual office follow-up for reassessment of symptoms and medication refills, or sooner should her symptoms recur.  Patient verbalized understanding of above plan of care and is in agreement.  All questions answered and support provided.     EMR Dragon/Transcription Disclaimer:  This document has been Dictated utilizing Dragon dictation.

## 2023-05-01 ENCOUNTER — OFFICE VISIT (OUTPATIENT)
Dept: FAMILY MEDICINE CLINIC | Facility: CLINIC | Age: 58
End: 2023-05-01
Payer: COMMERCIAL

## 2023-05-01 VITALS
HEIGHT: 63 IN | DIASTOLIC BLOOD PRESSURE: 82 MMHG | SYSTOLIC BLOOD PRESSURE: 116 MMHG | WEIGHT: 250 LBS | HEART RATE: 90 BPM | OXYGEN SATURATION: 97 % | BODY MASS INDEX: 44.3 KG/M2 | TEMPERATURE: 97.3 F

## 2023-05-01 DIAGNOSIS — E78.2 MIXED HYPERLIPIDEMIA: ICD-10-CM

## 2023-05-01 DIAGNOSIS — J44.9 CHRONIC OBSTRUCTIVE PULMONARY DISEASE, UNSPECIFIED COPD TYPE: ICD-10-CM

## 2023-05-01 DIAGNOSIS — K21.9 GASTROESOPHAGEAL REFLUX DISEASE, UNSPECIFIED WHETHER ESOPHAGITIS PRESENT: ICD-10-CM

## 2023-05-01 DIAGNOSIS — L91.8 SKIN TAG: ICD-10-CM

## 2023-05-01 DIAGNOSIS — F32.A DEPRESSIVE DISORDER: ICD-10-CM

## 2023-05-01 DIAGNOSIS — R73.09 ELEVATED GLUCOSE: ICD-10-CM

## 2023-05-01 DIAGNOSIS — E66.01 MORBID OBESITY WITH BMI OF 40.0-44.9, ADULT: Primary | ICD-10-CM

## 2023-05-01 DIAGNOSIS — R92.8 ABNORMAL MAMMOGRAM OF BOTH BREASTS: ICD-10-CM

## 2023-05-01 DIAGNOSIS — R25.1 TREMOR: ICD-10-CM

## 2023-05-01 RX ORDER — SEMAGLUTIDE 1.7 MG/.75ML
1.7 INJECTION, SOLUTION SUBCUTANEOUS WEEKLY
Qty: 3 ML | Refills: 0 | Status: SHIPPED | OUTPATIENT
Start: 2023-05-01

## 2023-05-01 NOTE — PROGRESS NOTES
Chief Complaint  Establish Care (New pt, discuss medications, c/o skin tag under L breast )    Subjective        Lizy Bryan presents to [facility]  History of Present Illness     LIZY BRYAN date of birth 1965, she is a 57-year-old female who presents today to establish care for chronic health issues.    She needs refills on her medications. She had a diagnostic mammogram and ultrasound done in 11/2022. She notes they want to do a follow-up right breast ultrasound and a mammogram again since her previous PCP has left she needs new orders.    She has a skin tag left breast at her bra line that is starting to bother her. It is getting bigger. She notes this morning when she looked at it, it looks like there is a big [place] around the skin now outside of the skin tag.    She takes albuterol for COPD. She is a former smoker. She notes when she quit smoking, she was having a lot of trouble breathing. She was hospitalized with pneumonia or bronchitis at least twice a year. She has not had any hospital visit for that since stopping tobacco. She notes she uses her albuterol once a month. She is using Spiriva 2 puffs daily as needed. She uses it more with the weather change.    She takes Lexapro for anxiety and depression. It is working well. She notes she has a lot going on with her.  Lost her daughter in 2015. Her  just passed away in 2022. She notes she does as best as she can. She tries to be happy because that is what they would like. She notes the main reason she was put on Lexapro was because she had panic attacks and every once in a while she can feel one coming through.  She has had significant improvement in panic attacks and general anxiety and depression since starting the Lexapro she notes she feels like the dose is okay where it is right now.    She takes Zetia for her cholesterol. She will not take a cholesterol medicine.  She would like to get her cholesterol  "rechecked.  Has been trying to work on her diet.      She is taking Saxenda 3 mg. She is doing fine on it. She notes she got discouraged about 2 months ago because she was not losing weight anymore. She did lose about 15 pounds and then has since plateaued.  She notes over the past month she has noticed that her eating has really increased. She did start intermittent fasting and she started 1500 calorie diet about a week ago. She notes she wants the weight off-thinks it is causing her chronic hip pain.     She takes Protonix for GERD. She just done the upper and lower GI a month ago.  And were okay    She takes spironolactone HCTZ for hypertension-but really more for lower leg swelling.  She notes since she has stopped the sodas, she is not having much swelling.    She takes trazodone for sleep as needed. She was put on trazodone because she started having nightmares after she left her .     She has seen cardiology due to abnormal EKG.  They did a stress test and everything came back fine. She denies chest pain. She notes she does feel some mild heart palpitations after she eats.    She has chronic tremors.  They are getting worse. She notes she does not have a family history of Parkinson's. She reports her mother head tremors.  She notices more tremors in her hands with intention.  But she notices that her head seems to have tremors all the time which recently seem to have worsened.    She has a family history of diabetes. All of her siblings except two now have diabetes. She notes she would like to check her A1c.    RANDY-7 Score: RANDY 7 Total Score: 12  PHQ-9 Total Score: PHQ-9: Brief Depression Severity Measure Score: 19      Objective   Vital Signs:  /82   Pulse 90   Temp 97.3 °F (36.3 °C)   Ht 160 cm (63\")   Wt 113 kg (250 lb)   SpO2 97%   BMI 44.29 kg/m²   Estimated body mass index is 44.29 kg/m² as calculated from the following:    Height as of this encounter: 160 cm (63\").    Weight as of " this encounter: 113 kg (250 lb).       Class 3 Severe Obesity (BMI >=40). Obesity-related health conditions include the following: hypertension, dyslipidemias and osteoarthritis. Obesity is newly identified. BMI is is above average; BMI management plan is completed. We discussed portion control and increasing exercise.    A review of systems was performed, and the pertinent positives are noted in the HPI.    Physical Exam  Vitals and nursing note reviewed.   Constitutional:       General: She is not in acute distress.     Appearance: She is well-developed. She is obese. She is not ill-appearing.   HENT:      Head: Normocephalic and atraumatic.      Mouth/Throat:      Mouth: Mucous membranes are moist.      Pharynx: Uvula midline. No posterior oropharyngeal erythema.   Eyes:      General: No scleral icterus.        Right eye: No discharge.         Left eye: No discharge.      Conjunctiva/sclera: Conjunctivae normal.   Neck:      Thyroid: No thyromegaly.   Cardiovascular:      Rate and Rhythm: Normal rate and regular rhythm.      Heart sounds: Normal heart sounds.   Pulmonary:      Effort: Pulmonary effort is normal.      Breath sounds: Normal breath sounds.   Abdominal:      General: Bowel sounds are normal. There is no distension.      Palpations: Abdomen is soft.      Tenderness: There is no abdominal tenderness.   Musculoskeletal:         General: No deformity.      Cervical back: Neck supple.      Comments: Gait smooth and steady   Lymphadenopathy:      Cervical: No cervical adenopathy.   Skin:     General: Skin is warm and dry.      Comments: Left lateral breat at bra line with pedunculated flesh colored mass, no s/s complications   Neurological:      Mental Status: She is alert and oriented to person, place, and time.      Comments: Fine tremors mostly in her head noted  Gait seems to be normal other than pronation noted in left foot   Psychiatric:         Mood and Affect: Mood normal.         Behavior:  Behavior normal.         Thought Content: Thought content normal.        Result Review :                   Assessment and Plan   Diagnoses and all orders for this visit:    1. Morbid obesity with BMI of 40.0-44.9, adult (Primary)  -     Semaglutide-Weight Management (Wegovy) 1.7 MG/0.75ML solution auto-injector; Inject 0.75 mL under the skin into the appropriate area as directed 1 (One) Time Per Week.  Dispense: 3 mL; Refill: 0  -     CBC & Differential; Future  -     TSH Rfx On Abnormal To Free T4; Future    2. Tremor    3. Gastroesophageal reflux disease, unspecified whether esophagitis present  -     CBC & Differential; Future    4. Depressive disorder  -     CBC & Differential; Future    5. Chronic obstructive pulmonary disease, unspecified COPD type  -     Comprehensive Metabolic Panel; Future    6. Mixed hyperlipidemia  -     Lipid Panel; Future    7. Abnormal mammogram of both breasts  -     Mammo diagnostic digital tomosynthesis bilateral w CAD; Future  -     US breast bilateral complete; Future    8. Skin tag  -     Ambulatory Referral to Dermatology    9. Elevated glucose  -     Hemoglobin A1c; Future      I have ordered diagnostic mammo and ultrasound for bilateral breast abnormalities noted in recent diagnostic mammogram.  If she does not hear back on these being scheduled in the next couple weeks to let me know.    I have also referred her to Derm for skin tag left lateral breast.    She does have tremors which need further work-up.  We will get labs today and review her chart.  Likely will need further evaluation and will have her return to office.    She has been on Saxenda with plateauing and limited weight loss.  We will switch over to Wegovy.  Side effects and dosing reviewed with patient.  Will likely need PA so she can continue the Saxenda until Wegovy approved.  Will need to follow-up with me in 1 month.    We will check lipids and screen for diabetes.    GERD: Seems to be stable with current  PPI which we will continue.    Anxiety and depression seem to be controlled with current Lexapro which we will continue.    COPD: Seems to be stable with current medications which we will continue.     No refills needed right now and patient is aware that she will need to let me know when refills needed so that I can make sure that she gets her refills on time.  She will follow-up 1 month of starting Wegovy.         Follow Up   Return in about 6 weeks (around 6/12/2023) for Recheck.  Patient was given instructions and counseling regarding her condition or for health maintenance advice. Please see specific information pulled into the AVS if appropriate.           Transcribed from ambient dictation for NORA Felix by Irene Alexis.  05/01/23   16:36 EDT    Patient or patient representative verbalized consent to the visit recording.  I have personally performed the services described in this document as transcribed by the above individual, and it is both accurate and complete.

## 2023-05-04 ENCOUNTER — PATIENT ROUNDING (BHMG ONLY) (OUTPATIENT)
Dept: FAMILY MEDICINE CLINIC | Facility: CLINIC | Age: 58
End: 2023-05-04
Payer: COMMERCIAL

## 2023-05-04 DIAGNOSIS — K21.9 GASTROESOPHAGEAL REFLUX DISEASE, UNSPECIFIED WHETHER ESOPHAGITIS PRESENT: ICD-10-CM

## 2023-05-04 DIAGNOSIS — F32.A DEPRESSIVE DISORDER: ICD-10-CM

## 2023-05-04 DIAGNOSIS — E78.2 MIXED HYPERLIPIDEMIA: ICD-10-CM

## 2023-05-04 DIAGNOSIS — J44.9 CHRONIC OBSTRUCTIVE PULMONARY DISEASE, UNSPECIFIED COPD TYPE: ICD-10-CM

## 2023-05-04 DIAGNOSIS — R73.09 ELEVATED GLUCOSE: ICD-10-CM

## 2023-05-04 DIAGNOSIS — E66.01 MORBID OBESITY WITH BMI OF 40.0-44.9, ADULT: ICD-10-CM

## 2023-05-04 NOTE — PROGRESS NOTES
A My-Chart message has been sent to the patient for PATIENT ROUNDING with [facility]

## 2023-05-05 LAB
ALBUMIN SERPL-MCNC: 4 G/DL (ref 3.5–5.2)
ALBUMIN/GLOB SERPL: 1.4 G/DL
ALP SERPL-CCNC: 66 U/L (ref 39–117)
ALT SERPL-CCNC: 35 U/L (ref 1–33)
AST SERPL-CCNC: 21 U/L (ref 1–32)
BASOPHILS # BLD AUTO: 0.1 10*3/MM3 (ref 0–0.2)
BASOPHILS NFR BLD AUTO: 2.1 % (ref 0–1.5)
BILIRUB SERPL-MCNC: 0.7 MG/DL (ref 0–1.2)
BUN SERPL-MCNC: 9 MG/DL (ref 6–20)
BUN/CREAT SERPL: 9.7 (ref 7–25)
CALCIUM SERPL-MCNC: 10.1 MG/DL (ref 8.6–10.5)
CHLORIDE SERPL-SCNC: 102 MMOL/L (ref 98–107)
CHOLEST SERPL-MCNC: 193 MG/DL (ref 0–200)
CO2 SERPL-SCNC: 28 MMOL/L (ref 22–29)
CREAT SERPL-MCNC: 0.93 MG/DL (ref 0.57–1)
EGFRCR SERPLBLD CKD-EPI 2021: 71.8 ML/MIN/1.73
EOSINOPHIL # BLD AUTO: 0.2 10*3/MM3 (ref 0–0.4)
EOSINOPHIL NFR BLD AUTO: 4.2 % (ref 0.3–6.2)
ERYTHROCYTE [DISTWIDTH] IN BLOOD BY AUTOMATED COUNT: 12.8 % (ref 12.3–15.4)
GLOBULIN SER CALC-MCNC: 2.8 GM/DL
GLUCOSE SERPL-MCNC: 89 MG/DL (ref 65–99)
HBA1C MFR BLD: 5.4 % (ref 4.8–5.6)
HCT VFR BLD AUTO: 43.7 % (ref 34–46.6)
HDLC SERPL-MCNC: 47 MG/DL (ref 40–60)
HGB BLD-MCNC: 15 G/DL (ref 12–15.9)
IMM GRANULOCYTES # BLD AUTO: 0.02 10*3/MM3 (ref 0–0.05)
IMM GRANULOCYTES NFR BLD AUTO: 0.4 % (ref 0–0.5)
LDLC SERPL CALC-MCNC: 117 MG/DL (ref 0–100)
LYMPHOCYTES # BLD AUTO: 1.77 10*3/MM3 (ref 0.7–3.1)
LYMPHOCYTES NFR BLD AUTO: 37.5 % (ref 19.6–45.3)
MCH RBC QN AUTO: 31.7 PG (ref 26.6–33)
MCHC RBC AUTO-ENTMCNC: 34.3 G/DL (ref 31.5–35.7)
MCV RBC AUTO: 92.4 FL (ref 79–97)
MONOCYTES # BLD AUTO: 0.51 10*3/MM3 (ref 0.1–0.9)
MONOCYTES NFR BLD AUTO: 10.8 % (ref 5–12)
NEUTROPHILS # BLD AUTO: 2.12 10*3/MM3 (ref 1.7–7)
NEUTROPHILS NFR BLD AUTO: 45 % (ref 42.7–76)
NRBC BLD AUTO-RTO: 0 /100 WBC (ref 0–0.2)
PLATELET # BLD AUTO: 320 10*3/MM3 (ref 140–450)
POTASSIUM SERPL-SCNC: 4.1 MMOL/L (ref 3.5–5.2)
PROT SERPL-MCNC: 6.8 G/DL (ref 6–8.5)
RBC # BLD AUTO: 4.73 10*6/MM3 (ref 3.77–5.28)
SODIUM SERPL-SCNC: 138 MMOL/L (ref 136–145)
TRIGL SERPL-MCNC: 163 MG/DL (ref 0–150)
TSH SERPL DL<=0.005 MIU/L-ACNC: 1.66 UIU/ML (ref 0.27–4.2)
VLDLC SERPL CALC-MCNC: 29 MG/DL (ref 5–40)
WBC # BLD AUTO: 4.72 10*3/MM3 (ref 3.4–10.8)

## 2023-06-01 ENCOUNTER — HOSPITAL ENCOUNTER (OUTPATIENT)
Dept: ULTRASOUND IMAGING | Facility: HOSPITAL | Age: 58
Discharge: HOME | End: 2023-06-01
Payer: COMMERCIAL

## 2023-06-01 ENCOUNTER — HOSPITAL ENCOUNTER (OUTPATIENT)
Dept: MAMMOGRAPHY | Facility: HOSPITAL | Age: 58
Discharge: HOME | End: 2023-06-01
Payer: COMMERCIAL

## 2023-06-01 DIAGNOSIS — R92.8 ABNORMAL MAMMOGRAM OF BOTH BREASTS: ICD-10-CM

## 2023-06-01 PROCEDURE — G0279 TOMOSYNTHESIS, MAMMO: HCPCS

## 2023-06-01 PROCEDURE — 77066 DX MAMMO INCL CAD BI: CPT

## 2023-06-01 PROCEDURE — 76642 ULTRASOUND BREAST LIMITED: CPT

## 2023-06-02 DIAGNOSIS — R92.8 ABNORMAL MAMMOGRAM OF BOTH BREASTS: Primary | ICD-10-CM

## 2023-06-08 ENCOUNTER — OFFICE VISIT (OUTPATIENT)
Dept: FAMILY MEDICINE CLINIC | Facility: CLINIC | Age: 58
End: 2023-06-08
Payer: COMMERCIAL

## 2023-06-08 VITALS
HEIGHT: 63 IN | HEART RATE: 84 BPM | TEMPERATURE: 97.1 F | SYSTOLIC BLOOD PRESSURE: 112 MMHG | BODY MASS INDEX: 43.55 KG/M2 | DIASTOLIC BLOOD PRESSURE: 78 MMHG | OXYGEN SATURATION: 98 % | WEIGHT: 245.8 LBS

## 2023-06-08 DIAGNOSIS — E66.01 MORBID OBESITY WITH BMI OF 40.0-44.9, ADULT: ICD-10-CM

## 2023-06-08 DIAGNOSIS — Z76.89 ENCOUNTER FOR WEIGHT MANAGEMENT: Primary | ICD-10-CM

## 2023-06-08 DIAGNOSIS — R30.0 DYSURIA: ICD-10-CM

## 2023-06-08 LAB
BILIRUB BLD-MCNC: NEGATIVE MG/DL
CLARITY, POC: ABNORMAL
COLOR UR: YELLOW
EXPIRATION DATE: ABNORMAL
GLUCOSE UR STRIP-MCNC: NEGATIVE MG/DL
KETONES UR QL: NEGATIVE
LEUKOCYTE EST, POC: ABNORMAL
Lab: ABNORMAL
NITRITE UR-MCNC: NEGATIVE MG/ML
PH UR: 5.5 [PH] (ref 5–8)
PROT UR STRIP-MCNC: NEGATIVE MG/DL
RBC # UR STRIP: NEGATIVE /UL
SP GR UR: 1.02 (ref 1–1.03)
UROBILINOGEN UR QL: NORMAL

## 2023-06-08 NOTE — PROGRESS NOTES
"Chief Complaint  Weight Check (1 month f/u weight management)    Subjective        Lizy Bryan presents to [facility]  History of Present Illness  pt here for weight management.  Has been on Wegovy and now had second dose of 2.4 mg.  So far doing well on current dose without side effects.    Had second dose of 2.4 mg wegovy.  Trying to continue weight loss-a little disappointed not lost more.  Has been increasing hydration.  Trying to get in 8-10 #16 oz mcbride/day.    Despite hydration has some sx of UTI and tends to get very sick with UTI-wants to make sure no UTI.  Intermittent sx, no flank pain, hematuria, fever, chills.     Denies significant n/v/d/constipation.  No dizziness.    Objective   Vital Signs:  /78   Pulse 84   Temp 97.1 °F (36.2 °C)   Ht 160 cm (63\")   Wt 111 kg (245 lb 12.8 oz)   SpO2 98%   BMI 43.54 kg/m²   Estimated body mass index is 43.54 kg/m² as calculated from the following:    Height as of this encounter: 160 cm (63\").    Weight as of this encounter: 111 kg (245 lb 12.8 oz).               Physical Exam  Vitals and nursing note reviewed.   Constitutional:       General: She is not in acute distress.     Appearance: She is well-developed. She is obese. She is not ill-appearing or diaphoretic.   HENT:      Head: Normocephalic and atraumatic.   Eyes:      General:         Right eye: No discharge.         Left eye: No discharge.      Conjunctiva/sclera: Conjunctivae normal.   Cardiovascular:      Rate and Rhythm: Normal rate and regular rhythm.   Pulmonary:      Effort: Pulmonary effort is normal.      Breath sounds: Normal breath sounds.   Abdominal:      General: Bowel sounds are normal. There is no distension.      Palpations: Abdomen is soft.      Tenderness: There is no abdominal tenderness. There is no right CVA tenderness or left CVA tenderness.   Musculoskeletal:         General: No deformity.      Comments: Gait smooth and steady   Skin:     " General: Skin is warm and dry.   Neurological:      General: No focal deficit present.      Mental Status: She is alert and oriented to person, place, and time.   Psychiatric:         Mood and Affect: Mood normal.         Behavior: Behavior normal.      Result Review :                   Assessment and Plan   Diagnoses and all orders for this visit:    1. Encounter for weight management (Primary)    2. Morbid obesity with BMI of 40.0-44.9, adult    3. Dysuria  -     POCT urinalysis dipstick, automated  -     Urine Culture - Urine, Urine, Clean Catch         She is currently doing well on current dose Wegovy.  She has lost 5 lbs since last visit.  Discussed continued diet and exercise.  Will continue current dose which is maintenance and see her back in 3 months unless problems.      Discussed s/s hypotension-she is on 2 diuretics for HTN and recommend monitoring at home,  May need to hold diuretics if hypotension with continued weight loss. Will let me know. Adequate hydration.  BP is stable.     POC UA non specific for UTI-send for cx.  Push fluids and let me know if any worsening in the meantime.       Follow Up   Return in about 3 months (around 9/8/2023) for Recheck.  Patient was given instructions and counseling regarding her condition or for health maintenance advice. Please see specific information pulled into the AVS if appropriate.       Answers submitted by the patient for this visit:  Other (Submitted on 6/1/2023)  Please describe your symptoms.: Follow up on wegovy  Have you had these symptoms before?: Yes  How long have you been having these symptoms?: Greater than 2 weeks  Primary Reason for Visit (Submitted on 6/1/2023)  What is the primary reason for your visit?: Other

## 2023-06-11 LAB
BACTERIA UR CULT: NORMAL
BACTERIA UR CULT: NORMAL

## 2023-07-03 PROBLEM — R42 DIZZINESS: Status: ACTIVE | Noted: 2023-07-03

## 2023-07-28 RX ORDER — SEMAGLUTIDE 2.4 MG/.75ML
2.4 INJECTION, SOLUTION SUBCUTANEOUS WEEKLY
Qty: 3 ML | Refills: 2 | Status: SHIPPED | OUTPATIENT
Start: 2023-07-28

## 2023-09-22 ENCOUNTER — OFFICE VISIT (OUTPATIENT)
Dept: NEUROLOGY | Facility: CLINIC | Age: 58
End: 2023-09-22
Payer: COMMERCIAL

## 2023-09-22 VITALS
HEIGHT: 63 IN | DIASTOLIC BLOOD PRESSURE: 86 MMHG | SYSTOLIC BLOOD PRESSURE: 122 MMHG | BODY MASS INDEX: 41.11 KG/M2 | OXYGEN SATURATION: 98 % | WEIGHT: 232 LBS | HEART RATE: 73 BPM

## 2023-09-22 DIAGNOSIS — H81.10 BENIGN PAROXYSMAL POSITIONAL VERTIGO, UNSPECIFIED LATERALITY: Primary | ICD-10-CM

## 2023-09-22 DIAGNOSIS — G25.0 BENIGN ESSENTIAL TREMOR: ICD-10-CM

## 2023-09-22 RX ORDER — PRIMIDONE 50 MG/1
50 TABLET ORAL NIGHTLY
Qty: 90 TABLET | Refills: 0 | Status: SHIPPED | OUTPATIENT
Start: 2023-09-22 | End: 2023-12-21

## 2023-09-22 NOTE — PROGRESS NOTES
[facility] NEUROLOGY         Date of Visit: 2023    Name: Lizy Bryan    :  1965    PCP: Magali Obrien APRN    Visit Type: an initial evaluation         Subjective     Patient ID: Lizy is a 57 y.o. female.         History of Present Illness  I had the pleasure of seeing your patient for the first time today.  As you may know she is a 57-year-old female here today for hospital follow-up for vertigo.    History:    Patient has history of hypertension, hyperlipidemia, anxiety, depression, COPD.    Patient presented to the emergency room on 7/3/2023 for evaluation for intermittent episodes of vertigo.  She states that both episodes occurred in the middle of the night and lasted for approximately 1 to 2 minutes in duration.  Upon admission to the ER patient was admitted for additional work-up including stroke work-up.  CTA of the head and neck was performed showing mild atherosclerotic disease with less than 50% stenosis in the left internal carotid artery with only mild atherosclerotic buildup in the right internal carotid artery with no significant vessel disease in the brain.  Patient also had an MRI of the brain showing no acute abnormalities.  Patient was discharged home with prescription for meclizine and told to follow-up with primary care.    Patient did follow-up with primary care who did refer her to us for additional evaluation.  She was also noted to have some tremor symptoms that she wanted to speak with us about.    Patient does report family history of tremor with her mom and dad both having tremor symptoms.  She denies any family history of known Parkinson's disease.  She denies any previous history of stroke.    Current:    Patient states that she does continue to have some intermittent episodes of dizziness.  She states that the episodes are much less frequent than they had been in the past.  She does describe them as an off-balance like sensation.  She states  they are brief in nature however can still happen several times per week.  She states they are severe when they first start but resolve quickly and then she can return to normal activity.  She does notice them with certain position changes such as laying down or sitting up suddenly.  She was also noticed to have some orthostatic blood pressures which could be contributing to symptoms.  She did complete some exercises on the video she watch for vestibular treatment.  She states that these did seem to help symptoms at least temporarily.    Patient does still have significant tremor.  She states it is worse in the head however also occurs in both hands.  She notices it more when she is trying to do something fine like paint or right.  She does not notice them as much at rest.  She does feel like the head tremor causes her neck discomfort.  No other new neurological complaints at today's visit.        The following portions of the patient's history were reviewed and updated as appropriate: allergies, current medications, past family history, past medical history, past social history, past surgical history, and problem list.                 Review of Systems   Constitutional:  Negative for activity change, appetite change, fatigue and unexpected weight change.   HENT:  Negative for dental problem, drooling, hearing loss, tinnitus, trouble swallowing and voice change.    Eyes:  Negative for visual disturbance.   Gastrointestinal:  Negative for constipation, diarrhea, nausea and vomiting.   Genitourinary:  Negative for difficulty urinating, frequency and urgency.   Musculoskeletal:  Positive for neck pain. Negative for gait problem.   Neurological:  Positive for dizziness and tremors. Negative for seizures, facial asymmetry, speech difficulty, weakness, light-headedness, numbness and headaches.   Psychiatric/Behavioral:  Negative for agitation, behavioral problems, confusion, hallucinations and sleep disturbance.        "    Current Medications:    Current Outpatient Medications   Medication Instructions    albuterol sulfate  (90 Base) MCG/ACT inhaler 2 puffs, Inhalation, Every 4 Hours PRN    cefuroxime (CEFTIN) 500 mg, Oral, 2 Times Daily    escitalopram (LEXAPRO) 10 mg, Oral, Daily    ezetimibe (ZETIA) 10 mg, Oral, Daily    hydrOXYzine (ATARAX) 10 mg, Oral, 2 Times Daily PRN    meclizine (ANTIVERT) 25 mg, Oral, 3 Times Daily PRN    miconazole (Zeasorb-AF) 2 % powder Apply under the arms and upper thighs once the area is much better to keep it dry.    pantoprazole (PROTONIX) 40 mg, Oral, Daily    primidone (MYSOLINE) 50 mg, Oral, Nightly    Spiriva Respimat 1.25 MCG/ACT aerosol solution inhaler 2 puffs, Inhalation, Daily PRN    spironolactone-hydrochlorothiazide (ALDACTAZIDE) 25-25 MG tablet TAKE 1 TABLET BY MOUTH ONCE DAILY AFTER FINISH BUMETANIDE    traZODone (DESYREL) 150 mg, Oral, Nightly    Wegovy 2.4 mg, Subcutaneous, Weekly          /86   Pulse 73   Ht 160 cm (63\")   Wt 105 kg (232 lb)   SpO2 98%   BMI 41.10 kg/m²                Objective     Neurological Exam  Mental Status  Awake, alert and oriented to person, place and time. Speech is normal. Language is fluent with no aphasia.    Cranial Nerves  CN II: Visual fields full to confrontation.  CN III, IV, VI: Extraocular movements intact bilaterally. Normal lids and orbits bilaterally. Pupils equal round and reactive to light bilaterally.  CN V: Facial sensation is normal.  CN VII: Full and symmetric facial movement.  CN IX, X: Palate elevates symmetrically  CN XI: Shoulder shrug strength is normal.  CN XII: Tongue midline without atrophy or fasciculations.    Motor  Normal muscle bulk throughout. Normal muscle tone. The following abnormal movements were seen: Yes nodding head tremor worse with activities.  Bilateral minimal hand tremor worse with writing and pouring of cups..   Strength is 5/5 throughout all four extremities.    Sensory  Sensation is " intact to light touch, pinprick, vibration and proprioception in all four extremities.    Reflexes  Deep tendon reflexes are 2+ and symmetric in all four extremities.    Coordination    Finger-to-nose, rapid alternating movements and heel-to-shin normal bilaterally without dysmetria.    Gait  Normal casual, toe, heel and tandem gait.    Physical Exam  Constitutional:       Appearance: Normal appearance. She is normal weight.   Eyes:      General: Lids are normal.      Extraocular Movements: Extraocular movements intact.      Pupils: Pupils are equal, round, and reactive to light.   Pulmonary:      Effort: Pulmonary effort is normal.   Skin:     General: Skin is warm.   Neurological:      Motor: Motor strength is normal.      Coordination: Coordination is intact.      Deep Tendon Reflexes: Reflexes are normal and symmetric.   Psychiatric:         Mood and Affect: Mood normal.         Speech: Speech normal.         Behavior: Behavior normal.                   Assessment & Plan     Diagnoses and all orders for this visit:    1. Benign paroxysmal positional vertigo, unspecified laterality (Primary)  -     Ambulatory Referral to Physical Therapy Evaluate and treat    2. Benign essential tremor  -     primidone (MYSOLINE) 50 MG tablet; Take 1 tablet by mouth Every Night for 90 days.  Dispense: 90 tablet; Refill: 0       At this time I do feel patient is having symptoms of benign paroxysmal positional vertigo.  We will go ahead and send her for vestibular therapy.    I do also feel that patient's tremors are most consistent with essential tremor.  We will go ahead and start her on primidone 50 mg at bedtime for this.    Follow-up next weeks or sooner if needed.            Delmis MELENDEZ    Neurology    [facility] [place]    Phone: [phone]    9/22/2023 , 14:12 EDT

## 2023-10-02 RX ORDER — SEMAGLUTIDE 2.4 MG/.75ML
INJECTION, SOLUTION SUBCUTANEOUS
Qty: 3 ML | Refills: 0 | Status: SHIPPED | OUTPATIENT
Start: 2023-10-02

## 2023-10-02 NOTE — TELEPHONE ENCOUNTER
Rx Refill Note  Requested Prescriptions     Pending Prescriptions Disp Refills    Wegovy 2.4 MG/0.75ML solution auto-injector [Pharmacy Med Name: WEGOVY PEN INJ 0.75ML 4'S 2.4MG] 3 mL 12     Sig: INJECT 2.4 MG UNDER THE SKIN INTO THE APPROPRIATE AREA ONCE PER WEEK AS DIRECTED      Last office visit with prescribing clinician: 7/10/2023   Last telemedicine visit with prescribing clinician: Visit date not found   Next office visit with prescribing clinician: Visit date not found                         Would you like a call back once the refill request has been completed: [] Yes [] No    If the office needs to give you a call back, can they leave a voicemail: [] Yes [] No    Julian Powell MA  10/02/23, 11:38 EDT

## 2023-10-19 ENCOUNTER — OFFICE VISIT (OUTPATIENT)
Dept: FAMILY MEDICINE CLINIC | Facility: CLINIC | Age: 58
End: 2023-10-19
Payer: COMMERCIAL

## 2023-10-19 VITALS
HEIGHT: 63 IN | WEIGHT: 236 LBS | SYSTOLIC BLOOD PRESSURE: 132 MMHG | BODY MASS INDEX: 41.82 KG/M2 | TEMPERATURE: 98.5 F | OXYGEN SATURATION: 100 % | DIASTOLIC BLOOD PRESSURE: 84 MMHG | HEART RATE: 78 BPM

## 2023-10-19 DIAGNOSIS — F33.1 MODERATE EPISODE OF RECURRENT MAJOR DEPRESSIVE DISORDER: ICD-10-CM

## 2023-10-19 DIAGNOSIS — E66.01 MORBID OBESITY WITH BMI OF 40.0-44.9, ADULT: ICD-10-CM

## 2023-10-19 DIAGNOSIS — R25.1 TREMOR: ICD-10-CM

## 2023-10-19 DIAGNOSIS — Z23 FLU VACCINE NEED: ICD-10-CM

## 2023-10-19 DIAGNOSIS — F41.1 GENERALIZED ANXIETY DISORDER: ICD-10-CM

## 2023-10-19 DIAGNOSIS — Z76.89 ENCOUNTER FOR WEIGHT MANAGEMENT: Primary | ICD-10-CM

## 2023-10-19 RX ORDER — FLUOXETINE HYDROCHLORIDE 20 MG/1
20 CAPSULE ORAL DAILY
Qty: 30 CAPSULE | Refills: 1 | Status: SHIPPED | OUTPATIENT
Start: 2023-10-19

## 2023-10-19 RX ORDER — SEMAGLUTIDE 2.4 MG/.75ML
2.4 INJECTION, SOLUTION SUBCUTANEOUS WEEKLY
Qty: 9 ML | Refills: 0 | Status: SHIPPED | OUTPATIENT
Start: 2023-10-19

## 2023-10-19 NOTE — PROGRESS NOTES
"Chief Complaint  Anxiety (F/u anxiety )    Subjective        Lizy Bryan presents to [facility]  History of Present Illness  pt here for f/u on anxiety, depression, weight mgmt.      Since last visit, she has seen neuro.  She was started on primidone, but after reading side effects, she was too scared to start it.  Her other alternative for tremors was botox injection which she is considering.      She has been taking lexapro and feels like her sx have been improved.  Her sister is on prozac and said it helps with energy.  Pt is exhausted all the time, feels like she could lay in bed all day and has no energy which she feels is due to mood.  No side effects on lexapro.      PHQ-9 Total Score: PHQ-9: Brief Depression Severity Measure Score: 16  RANDY-7 Score: RANDY 7 Total Score: 14    Taking Wegovy regularly without side effects.  Little more constipated just after injection, but returns to normal without intervention.  She feels like some weeks the medicine is not working.  Some weeks she has appetite suppression and no cravings for sweets.  Other weeks, she has normal appetite and no suppression of cravings.  She injects in stomach and makes sure she always gets full dose.  She has no had weight loss since last visit which disappoints her, but she has noticed that her pants are looser and she can see noticeable decrease in visceral body fat.     COPD:  stable, no recent exacerbations.  She had to use inhaler yesterday due to some tightness-improved with use.  No current dyspnea, cough.      Objective   Vital Signs:  /84   Pulse 78   Temp 98.5 °F (36.9 °C)   Ht 160 cm (63\")   Wt 107 kg (236 lb)   SpO2 100%   BMI 41.81 kg/m²   Estimated body mass index is 41.81 kg/m² as calculated from the following:    Height as of this encounter: 160 cm (63\").    Weight as of this encounter: 107 kg (236 lb).               Physical Exam  Vitals and nursing note reviewed.   Constitutional:  "      General: She is not in acute distress.     Appearance: She is well-developed. She is not ill-appearing or diaphoretic.      Comments: Well dressed and well appearing   HENT:      Head: Normocephalic and atraumatic.   Eyes:      General:         Right eye: No discharge.         Left eye: No discharge.      Conjunctiva/sclera: Conjunctivae normal.   Cardiovascular:      Rate and Rhythm: Normal rate and regular rhythm.      Heart sounds: Normal heart sounds.   Pulmonary:      Effort: Pulmonary effort is normal.      Breath sounds: Normal breath sounds. No wheezing or rales.   Abdominal:      General: Bowel sounds are normal.      Palpations: Abdomen is soft.   Musculoskeletal:         General: No deformity.      Comments: Gait smooth and steady   Skin:     General: Skin is warm and dry.   Neurological:      General: No focal deficit present.      Mental Status: She is alert and oriented to person, place, and time.      Comments: Fine head tremor noted   Psychiatric:         Mood and Affect: Mood normal.         Behavior: Behavior normal.         Thought Content: Thought content normal.      Comments: Very pleasant, conversant        Result Review :                   Assessment and Plan   Diagnoses and all orders for this visit:    1. Encounter for weight management (Primary)  -     Semaglutide-Weight Management (Wegovy) 2.4 MG/0.75ML solution auto-injector; Inject 2.4 mg under the skin into the appropriate area as directed 1 (One) Time Per Week.  Dispense: 9 mL; Refill: 0    2. Morbid obesity with BMI of 40.0-44.9, adult  -     Semaglutide-Weight Management (Wegovy) 2.4 MG/0.75ML solution auto-injector; Inject 2.4 mg under the skin into the appropriate area as directed 1 (One) Time Per Week.  Dispense: 9 mL; Refill: 0    3. Moderate episode of recurrent major depressive disorder  -     FLUoxetine (PROzac) 20 MG capsule; Take 1 capsule by mouth Daily.  Dispense: 30 capsule; Refill: 1    4. Generalized anxiety  disorder  -     FLUoxetine (PROzac) 20 MG capsule; Take 1 capsule by mouth Daily.  Dispense: 30 capsule; Refill: 1    5. Tremor    6. Flu vaccine need  -     Fluzone >6 Months (1965-1513)    Weight mgmt:  no weight loss this visit, but different scales used at appts which could alter.  She has noticed decreased visceral fat.  Will continue.  Overall BMI has decreased.  Discussed trying injections in thighs to see if she can notice more consistent appetite suppression.      Will switch from lexapro to prozac-can make direct switch.  She will let me know in 1 month how sx are-both PHQ and RANDY pretty high. We may need to increase prozac dose.  Some of reason she may not be losing weight could be due to inactivity.     Discussed tremor and reviewed neuro notes.  Pt will discuss injections with neuro as she is worried about side effects primidone.           Follow Up   Return in about 4 months (around 2/19/2024) for Recheck.  Patient was given instructions and counseling regarding her condition or for health maintenance advice. Please see specific information pulled into the AVS if appropriate.         Answers submitted by the patient for this visit:  Other (Submitted on 10/17/2023)  Please describe your symptoms.: Follow up  Have you had these symptoms before?: Yes  How long have you been having these symptoms?: Greater than 2 weeks  Please list any medications you are currently taking for this condition.: Wegovy  Primary Reason for Visit (Submitted on 10/17/2023)  What is the primary reason for your visit?: Other

## 2023-10-26 RX ORDER — TIOTROPIUM BROMIDE INHALATION SPRAY 1.56 UG/1
2 SPRAY, METERED RESPIRATORY (INHALATION) DAILY PRN
Qty: 4 G | Refills: 4 | Status: SHIPPED | OUTPATIENT
Start: 2023-10-26

## 2023-10-26 RX ORDER — ALBUTEROL SULFATE 90 UG/1
2 AEROSOL, METERED RESPIRATORY (INHALATION) EVERY 4 HOURS PRN
Qty: 8.5 G | Refills: 4 | Status: SHIPPED | OUTPATIENT
Start: 2023-10-26

## 2023-10-26 NOTE — TELEPHONE ENCOUNTER
Rx Refill Note  Requested Prescriptions      No prescriptions requested or ordered in this encounter      Last office visit with prescribing clinician: 10/19/2023   Last telemedicine visit with prescribing clinician: Visit date not found   Next office visit with prescribing clinician: Visit date not found                         Would you like a call back once the refill request has been completed: [] Yes [] No    If the office needs to give you a call back, can they leave a voicemail: [] Yes [] No    Noelle Cadet MA  10/26/23, 11:09 EDT

## 2023-11-02 ENCOUNTER — TELEPHONE (OUTPATIENT)
Dept: PEDIATRICS | Facility: OTHER | Age: 58
End: 2023-11-02

## 2023-11-02 NOTE — TELEPHONE ENCOUNTER
Caller: Lizy Bryan    Relationship to patient: Self    Best call back number: [phone]     Chief complaint: PAPSMEAR    Type of visit: IN OFFICE PROCEDURE    Requested date: 11/16/23, 4:00 PM      Additional notes: PER HUB WORKFLOW, PAPSMEAR WITHOUT A PHYSICAL IS CONSIDERED AN IN-OFFICE PROCEDURE.     PLEASE ADVISE

## 2023-11-03 ENCOUNTER — OFFICE VISIT (OUTPATIENT)
Dept: NEUROLOGY | Facility: CLINIC | Age: 58
End: 2023-11-03
Payer: COMMERCIAL

## 2023-11-03 VITALS
OXYGEN SATURATION: 96 % | WEIGHT: 238 LBS | SYSTOLIC BLOOD PRESSURE: 136 MMHG | HEIGHT: 63 IN | HEART RATE: 83 BPM | DIASTOLIC BLOOD PRESSURE: 82 MMHG | BODY MASS INDEX: 42.17 KG/M2

## 2023-11-03 DIAGNOSIS — H81.10 BENIGN PAROXYSMAL POSITIONAL VERTIGO, UNSPECIFIED LATERALITY: ICD-10-CM

## 2023-11-03 DIAGNOSIS — G25.0 BENIGN ESSENTIAL TREMOR: Primary | ICD-10-CM

## 2023-11-03 NOTE — PROGRESS NOTES
[facility] NEUROLOGY         Date of Visit: 11/3/2023    Name: Lizy Bryan    :  1965    PCP: Magali Obrien APRN    Visit Type: an initial evaluation         Subjective     Patient ID: Lizy is a 57 y.o. female.         History of Present Illness  I had the pleasure of seeing your patient for the first time today.  As you may know she is a 57-year-old female here today for hospital follow-up for vertigo.    History:    Patient has history of hypertension, hyperlipidemia, anxiety, depression, COPD.    Patient presented to the emergency room on 7/3/2023 for evaluation for intermittent episodes of vertigo.  She states that both episodes occurred in the middle of the night and lasted for approximately 1 to 2 minutes in duration.  Upon admission to the ER patient was admitted for additional work-up including stroke work-up.  CTA of the head and neck was performed showing mild atherosclerotic disease with less than 50% stenosis in the left internal carotid artery with only mild atherosclerotic buildup in the right internal carotid artery with no significant vessel disease in the brain.  Patient also had an MRI of the brain showing no acute abnormalities.  Patient was discharged home with prescription for meclizine and told to follow-up with primary care.    Patient did follow-up with primary care who did refer her to us for additional evaluation.  She was also noted to have some tremor symptoms that she wanted to speak with us about.    Patient does report family history of tremor with her mom and dad both having tremor symptoms.  She denies any family history of known Parkinson's disease.  She denies any previous history of stroke.    Current:    Patient states that She has not had any significant dizziness episodes recently.  She did not end up getting a call about physical therapy however has not been concerned about it as she had noticed that the dizziness had resolved.    Patient  does still have significant tremor.  We did prescribe her primidone 50 mg at her last appointment.  She has not yet started the medication as she had some concerns about side effect issues including memory loss with the medication.  She wanted to ask more questions prior to beginning medicine.  She still states head tremor is the most severe part of her tremor symptoms.  We have also talked about possible Botox for cervical dystonia in the future if medication is not helpful.  She does have a history of previous COPD diagnosis.  She does have albuterol and Spiriva inhalers that she uses as needed.  Because of this we did not start her on propranolol therapy.          The following portions of the patient's history were reviewed and updated as appropriate: allergies, current medications, past family history, past medical history, past social history, past surgical history, and problem list.                 Review of Systems   Constitutional:  Negative for activity change, appetite change, fatigue and unexpected weight change.   HENT:  Negative for dental problem, drooling, hearing loss, tinnitus, trouble swallowing and voice change.    Eyes:  Negative for visual disturbance.   Gastrointestinal:  Negative for constipation, diarrhea, nausea and vomiting.   Genitourinary:  Negative for difficulty urinating, frequency and urgency.   Musculoskeletal:  Positive for neck pain. Negative for gait problem.   Neurological:  Positive for dizziness and tremors. Negative for seizures, facial asymmetry, speech difficulty, weakness, light-headedness, numbness and headaches.   Psychiatric/Behavioral:  Negative for agitation, behavioral problems, confusion, hallucinations and sleep disturbance.             Current Medications:    Current Outpatient Medications   Medication Instructions    albuterol sulfate  (90 Base) MCG/ACT inhaler 2 puffs, Inhalation, Every 4 Hours PRN    ezetimibe (ZETIA) 10 mg, Oral, Daily    FLUoxetine  "(PROZAC) 20 mg, Oral, Daily    hydrOXYzine (ATARAX) 10 mg, Oral, 2 Times Daily PRN    meclizine (ANTIVERT) 25 mg, Oral, 3 Times Daily PRN    miconazole (Zeasorb-AF) 2 % powder Apply under the arms and upper thighs once the area is much better to keep it dry.    pantoprazole (PROTONIX) 40 mg, Oral, Daily    Spiriva Respimat 1.25 MCG/ACT aerosol solution inhaler 2 puffs, Inhalation, Daily PRN    spironolactone-hydrochlorothiazide (ALDACTAZIDE) 25-25 MG tablet TAKE 1 TABLET BY MOUTH ONCE DAILY AFTER FINISH BUMETANIDE    traZODone (DESYREL) 150 mg, Oral, Nightly    Wegovy 2.4 mg, Subcutaneous, Weekly          /82   Pulse 83   Ht 160 cm (63\")   Wt 108 kg (238 lb)   SpO2 96%   BMI 42.16 kg/m²                Objective     Neurological Exam  Mental Status  Awake, alert and oriented to person, place and time. Speech is normal. Language is fluent with no aphasia.    Cranial Nerves  CN II: Visual fields full to confrontation.  CN III, IV, VI: Extraocular movements intact bilaterally. Normal lids and orbits bilaterally. Pupils equal round and reactive to light bilaterally.  CN V: Facial sensation is normal.  CN VII: Full and symmetric facial movement.  CN IX, X: Palate elevates symmetrically  CN XI: Shoulder shrug strength is normal.  CN XII: Tongue midline without atrophy or fasciculations.    Motor  Normal muscle bulk throughout. Normal muscle tone. The following abnormal movements were seen: Yes nodding head tremor worse with activities.  Bilateral minimal hand tremor worse with writing and pouring of cups..   Strength is 5/5 throughout all four extremities.    Sensory  Sensation is intact to light touch, pinprick, vibration and proprioception in all four extremities.    Reflexes  Deep tendon reflexes are 2+ and symmetric in all four extremities.    Coordination    Finger-to-nose, rapid alternating movements and heel-to-shin normal bilaterally without dysmetria.    Gait  Normal casual, toe, heel and tandem " gait.      Physical Exam  Constitutional:       Appearance: Normal appearance. She is normal weight.   Eyes:      General: Lids are normal.      Extraocular Movements: Extraocular movements intact.      Pupils: Pupils are equal, round, and reactive to light.   Pulmonary:      Effort: Pulmonary effort is normal.   Skin:     General: Skin is warm.   Neurological:      Motor: Motor strength is normal.     Coordination: Coordination is intact.      Deep Tendon Reflexes: Reflexes are normal and symmetric.   Psychiatric:         Mood and Affect: Mood normal.         Speech: Speech normal.         Behavior: Behavior normal.                     Assessment & Plan     Diagnoses and all orders for this visit:    1. Benign essential tremor (Primary)    2. Benign paroxysmal positional vertigo, unspecified laterality         At this time we did discuss possible side effects to primidone.  We did discuss that there are some studies that are concerning for possible memory difficulties associated with long-term use of antiepileptic medications.  She is starting on a very low-dose which she is not the same as the does typically use for seizure prevention.  We have used primidone for many years and treatment of tremor without significant concern for memory impairment because of the medication.    Patient states that she would like to start on the medication and see how she does.  We may consider alternative options for treatment in the future if symptoms or not improving or if she has side effect complaints.    Follow-up in 6 weeks or sooner if needed.            Delmis MELENDEZ    Neurology    [facility] [place]    Phone: [phone]    11/3/2023 , 09:07 EDT

## 2023-11-15 ENCOUNTER — HOSPITAL ENCOUNTER (OUTPATIENT)
Dept: MAMMOGRAPHY | Facility: HOSPITAL | Age: 58
Discharge: HOME | End: 2023-11-15
Admitting: NURSE PRACTITIONER
Payer: COMMERCIAL

## 2023-11-15 ENCOUNTER — HOSPITAL ENCOUNTER (OUTPATIENT)
Dept: ULTRASOUND IMAGING | Facility: HOSPITAL | Age: 58
Discharge: HOME | End: 2023-11-15
Payer: COMMERCIAL

## 2023-11-15 DIAGNOSIS — R92.8 ABNORMAL MAMMOGRAM OF BOTH BREASTS: ICD-10-CM

## 2023-11-15 PROCEDURE — 76642 ULTRASOUND BREAST LIMITED: CPT

## 2023-11-15 PROCEDURE — 77066 DX MAMMO INCL CAD BI: CPT

## 2023-11-15 PROCEDURE — G0279 TOMOSYNTHESIS, MAMMO: HCPCS

## 2023-11-16 ENCOUNTER — OFFICE VISIT (OUTPATIENT)
Dept: FAMILY MEDICINE CLINIC | Facility: CLINIC | Age: 58
End: 2023-11-16
Payer: COMMERCIAL

## 2023-11-16 VITALS
BODY MASS INDEX: 41.82 KG/M2 | DIASTOLIC BLOOD PRESSURE: 75 MMHG | HEIGHT: 63 IN | TEMPERATURE: 98.2 F | HEART RATE: 78 BPM | WEIGHT: 236 LBS | SYSTOLIC BLOOD PRESSURE: 115 MMHG | OXYGEN SATURATION: 96 %

## 2023-11-16 DIAGNOSIS — Z01.419 WELL WOMAN EXAM: Primary | ICD-10-CM

## 2023-11-16 DIAGNOSIS — Z11.51 SCREENING FOR HUMAN PAPILLOMAVIRUS (HPV): ICD-10-CM

## 2023-11-16 DIAGNOSIS — R73.09 ELEVATED GLUCOSE: ICD-10-CM

## 2023-11-16 DIAGNOSIS — Z12.4 SCREENING FOR CERVICAL CANCER: ICD-10-CM

## 2023-11-16 DIAGNOSIS — E66.01 MORBID OBESITY WITH BMI OF 40.0-44.9, ADULT: ICD-10-CM

## 2023-11-16 DIAGNOSIS — F32.A DEPRESSIVE DISORDER: ICD-10-CM

## 2023-11-20 LAB
A VAGINAE DNA VAG QL NAA+PROBE: NORMAL SCORE
BVAB2 DNA VAG QL NAA+PROBE: NORMAL SCORE
C ALBICANS DNA VAG QL NAA+PROBE: NEGATIVE
C GLABRATA DNA VAG QL NAA+PROBE: NEGATIVE
C TRACH DNA VAG QL NAA+PROBE: NEGATIVE
HSV1 DNA SPEC QL NAA+PROBE: NEGATIVE
HSV2 DNA SPEC QL NAA+PROBE: NEGATIVE
MEGA1 DNA VAG QL NAA+PROBE: NORMAL SCORE
N GONORRHOEA DNA VAG QL NAA+PROBE: NEGATIVE
T VAGINALIS DNA VAG QL NAA+PROBE: NEGATIVE

## 2023-11-21 ENCOUNTER — TELEPHONE (OUTPATIENT)
Dept: FAMILY MEDICINE CLINIC | Facility: CLINIC | Age: 58
End: 2023-11-21
Payer: COMMERCIAL

## 2023-11-21 LAB
AGE GDLN ACOG TESTING: NORMAL
CYTOLOGIST CVX/VAG CYTO: NORMAL
CYTOLOGY CVX/VAG DOC CYTO: NORMAL
CYTOLOGY CVX/VAG DOC THIN PREP: NORMAL
DX ICD CODE: NORMAL
HIV 1 & 2 AB SER-IMP: NORMAL
HPV GENOTYPE REFLEX: NORMAL
HPV I/H RISK 4 DNA CVX QL PROBE+SIG AMP: NEGATIVE
OTHER STN SPEC: NORMAL
STAT OF ADQ CVX/VAG CYTO-IMP: NORMAL

## 2023-11-22 RX ORDER — METRONIDAZOLE 500 MG/1
500 TABLET ORAL 2 TIMES DAILY
Qty: 14 TABLET | Refills: 0 | Status: SHIPPED | OUTPATIENT
Start: 2023-11-22 | End: 2023-11-29

## 2023-12-13 DIAGNOSIS — F33.1 MODERATE EPISODE OF RECURRENT MAJOR DEPRESSIVE DISORDER: ICD-10-CM

## 2023-12-13 DIAGNOSIS — F41.1 GENERALIZED ANXIETY DISORDER: ICD-10-CM

## 2023-12-13 NOTE — PROGRESS NOTES
"Chief Complaint  Follow-up (Follow weight management, discuss pap smear, pt states she has not had one)    Subjective        Lizy Bryan presents to [facility]  History of Present Illness    Pt in for physical with well woman exam and routine pap smear. Family hx of cervical cancer-mom. Otherwise no s/s of infection. Not sexually active.  passed away a few years ago, no sexual partners since. Normal BMs, voiding spontaneously-no abnormal urine. Has had to wear pads because of intermittent incontinence. Also noticed that sometimes on the pad there is a green discoloration to the discharge. No itching or burning or vaginal irritation. Does leave the pad on all day before changing and does not look at paper when she is wiping.  Up-to-date on mammo.    Pt also concerned with A1C and weight. She has picked up a lot of weight since her daughter passed away in 2015 and she has been trying to do better, but then her  passed away last year so it has been very hard. She wants to make sure all of her labs: cholesterol, kidneys, liver, etc. Are doing ok.  Continues Wegovy for weight management at maintenance dose.  She is concerned as she is not losing weight as she had been previously.      Objective   Vital Signs:  /75   Pulse 78   Temp 98.2 °F (36.8 °C)   Ht 160 cm (63\")   Wt 107 kg (236 lb)   SpO2 96%   BMI 41.81 kg/m²   Estimated body mass index is 41.81 kg/m² as calculated from the following:    Height as of this encounter: 160 cm (63\").    Weight as of this encounter: 107 kg (236 lb).               Physical Exam  Vitals reviewed. Exam conducted with a chaperone present.   Constitutional:       General: She is not in acute distress.     Appearance: Normal appearance. She is obese. She is not ill-appearing.   HENT:      Head: Normocephalic.      Nose: Nose normal.      Mouth/Throat:      Mouth: Mucous membranes are moist.      Pharynx: Oropharynx is clear. " "Subjective   Reason for Visit: Vidhi Kolb is an 68 y.o. female here for a Medicare Wellness visit.     Past Medical, Surgical, and Family History reviewed and updated in chart.    Reviewed all medications by prescribing practitioner or clinical pharmacist (such as prescriptions, OTCs, herbal therapies and supplements) and documented in the medical record.    HPI  Fu graves, hyperlipidemia, osteopenia    Patient Care Team:  Carlos Arcos MD as PCP - General  Carlos Arcos [facility] P[facility]     Review of Systems  Feels well    Objective   Vitals:  /68   Pulse 68   Temp 36.2 °C (97.1 °F)   Ht 1.524 m (5')   Wt 54.9 kg (121 lb)   SpO2 99%   BMI 23.63 kg/m²       Physical Exam  Gen nad, affect wnl  Heentt eomfg, face symmetric, ncat  Neck w/o la, tm, bruit  Lungs clear   Cv rrr nl s1, s2  Ext w/o edema  Neuro grossly nonfocal  Skin good color    Labs reviewed \"hormone doctor\"  From July total chol 244    Assessment/Plan   Medicare wellness  Hyperlipidemia  Osteopenia  Grave's disease    Benefits statin reviewed  Rosuv 5 mg  Fu 3 mos w/ labs         "   Eyes:      Extraocular Movements: Extraocular movements intact.      Conjunctiva/sclera: Conjunctivae normal.      Pupils: Pupils are equal, round, and reactive to light.   Cardiovascular:      Rate and Rhythm: Normal rate and regular rhythm.      Pulses: Normal pulses.      Heart sounds: Normal heart sounds.   Pulmonary:      Effort: Pulmonary effort is normal.      Breath sounds: Normal breath sounds.   Abdominal:      General: Bowel sounds are normal.      Palpations: Abdomen is soft.   Genitourinary:     General: Normal vulva.      Exam position: Lithotomy position.      Calos stage (genital): 5.      Labia:         Right: No lesion.         Left: No lesion.       Vagina: No tenderness or lesions.      Cervix: Discharge and cervical bleeding present. No erythema.      Rectum: Normal.   Musculoskeletal:         General: Normal range of motion.      Cervical back: Normal range of motion and neck supple.   Skin:     General: Skin is warm and dry.      Capillary Refill: Capillary refill takes less than 2 seconds.   Neurological:      General: No focal deficit present.      Mental Status: She is alert and oriented to person, place, and time.   Psychiatric:         Mood and Affect: Mood normal.         Behavior: Behavior normal.        Result Review :                   Assessment and Plan   Diagnoses and all orders for this visit:    1. Well woman exam (Primary)  -     IGP,Aptima HPV,Age Gdln; Future  -     IGP,Aptima HPV,Age Gdln  -     NuSwab VG+ & HSV  -     Microalbumin / Creatinine Urine Ratio - Urine, Clean Catch    2. Screening for human papillomavirus (HPV)    3. Screening for cervical cancer    4. Morbid obesity with BMI of 40.0-44.9, adult  -     Hemoglobin A1c  -     Comprehensive Metabolic Panel  -     Lipid Panel With LDL / HDL Ratio; Future  -     CBC & Differential; Future  -     Microalbumin / Creatinine Urine Ratio - Urine, Clean Catch    5. Elevated glucose  -     Hemoglobin A1c    6. Depressive  disorder  -     CBC & Differential; Future    Other orders  -     IGP, Aptima HPV, Rfx 16 / 18,45    Pap completed without significant abnormalities and exam.  HPV per guidelines.    Appropriate health maintenance and prevention topics specific for this patient were discussed today.  Additionally, health goals, and health concerns addressed as appropriate.  Pt was encouraged to stay up to date on recommended screenings and vaccines based on USPSTF guidelines.       Educated on diet and portion control and the importance of incorporating moderate exercise. Will return for repeat labs.  She does seem to be at plateau on maintenance dose of Wegovy.  May benefit from switch to us that found when it is out and if approved by insurance.  Diet and exercise to help get her out of weight plateau discussed.    Will perform kegel exercises to increase pelvic floor muscles for intermittent urinary incontinence.        Follow Up   No follow-ups on file.  Patient was given instructions and counseling regarding her condition or for health maintenance advice. Please see specific information pulled into the AVS if appropriate.         Answers submitted by the patient for this visit:  Primary Reason for Visit (Submitted on 11/9/2023)  What is the primary reason for your visit?: Physical    Student present and participated in some aspects of exam/HPI with pt consent.  Pt was seen and examined by me.  DX, MGMT all discussed personally with patient. Documentation done by student and myself.

## 2023-12-13 NOTE — TELEPHONE ENCOUNTER
Pt scheduled for LABS today.  Requesting a 3 month supply of Fluoxetine (PROzac) 20 mg capsule be sent to the [facility] to be refilled.  Please and Thank You.

## 2023-12-14 DIAGNOSIS — F41.1 GENERALIZED ANXIETY DISORDER: ICD-10-CM

## 2023-12-14 DIAGNOSIS — F33.1 MODERATE EPISODE OF RECURRENT MAJOR DEPRESSIVE DISORDER: ICD-10-CM

## 2023-12-14 LAB
ALBUMIN SERPL-MCNC: 4.4 G/DL (ref 3.5–5.2)
ALBUMIN/CREAT UR: 15 MG/G CREAT (ref 0–29)
ALBUMIN/GLOB SERPL: 1.8 G/DL
ALP SERPL-CCNC: 75 U/L (ref 39–117)
ALT SERPL-CCNC: 24 U/L (ref 1–33)
AST SERPL-CCNC: 18 U/L (ref 1–32)
BILIRUB SERPL-MCNC: 0.6 MG/DL (ref 0–1.2)
BUN SERPL-MCNC: 9 MG/DL (ref 6–20)
BUN/CREAT SERPL: 10.7 (ref 7–25)
CALCIUM SERPL-MCNC: 9.3 MG/DL (ref 8.6–10.5)
CHLORIDE SERPL-SCNC: 103 MMOL/L (ref 98–107)
CO2 SERPL-SCNC: 25 MMOL/L (ref 22–29)
CREAT SERPL-MCNC: 0.84 MG/DL (ref 0.57–1)
CREAT UR-MCNC: 67.3 MG/DL
EGFRCR SERPLBLD CKD-EPI 2021: 81.2 ML/MIN/1.73
GLOBULIN SER CALC-MCNC: 2.4 GM/DL
GLUCOSE SERPL-MCNC: 86 MG/DL (ref 65–99)
HBA1C MFR BLD: 4.9 % (ref 4.8–5.6)
MICROALBUMIN UR-MCNC: 10.3 UG/ML
POTASSIUM SERPL-SCNC: 4.4 MMOL/L (ref 3.5–5.2)
PROT SERPL-MCNC: 6.8 G/DL (ref 6–8.5)
SODIUM SERPL-SCNC: 136 MMOL/L (ref 136–145)

## 2023-12-14 RX ORDER — FLUOXETINE HYDROCHLORIDE 20 MG/1
20 CAPSULE ORAL DAILY
Qty: 90 CAPSULE | Refills: 1 | Status: SHIPPED | OUTPATIENT
Start: 2023-12-14

## 2023-12-14 RX ORDER — FLUOXETINE HYDROCHLORIDE 20 MG/1
20 CAPSULE ORAL DAILY
Qty: 30 CAPSULE | Refills: 1 | Status: CANCELLED | OUTPATIENT
Start: 2023-12-14

## 2023-12-14 NOTE — TELEPHONE ENCOUNTER
Rx Refill Note  Requested Prescriptions     Pending Prescriptions Disp Refills    FLUoxetine (PROzac) 20 MG capsule 30 capsule 1     Sig: Take 1 capsule by mouth Daily.      Last office visit with prescribing clinician: 11/16/2023   Last telemedicine visit with prescribing clinician: Visit date not found   Next office visit with prescribing clinician: Visit date not found                         Would you like a call back once the refill request has been completed: [] Yes [] No    If the office needs to give you a call back, can they leave a voicemail: [] Yes [] No    Julian Powell MA  12/14/23, 09:26 EST

## 2024-01-30 ENCOUNTER — OFFICE VISIT (OUTPATIENT)
Dept: FAMILY MEDICINE CLINIC | Facility: CLINIC | Age: 59
End: 2024-01-30
Payer: COMMERCIAL

## 2024-01-30 VITALS
OXYGEN SATURATION: 96 % | WEIGHT: 218.3 LBS | HEIGHT: 63 IN | SYSTOLIC BLOOD PRESSURE: 118 MMHG | HEART RATE: 79 BPM | DIASTOLIC BLOOD PRESSURE: 64 MMHG | BODY MASS INDEX: 38.68 KG/M2

## 2024-01-30 DIAGNOSIS — Z86.79 HISTORY OF HIGH BLOOD PRESSURE: Primary | ICD-10-CM

## 2024-01-30 NOTE — PROGRESS NOTES
"Chief Complaint  Hypertension    Subjective        Lizy Bryan presents to [facility]  History of Present Illness  Patient is here due to concern for hypertension that she is concerned is causing a headache.  She recently checked blood pressure to make sure her cuff is reading correctly and noted her blood pressure since has been running approximately 150-160 over low 100s.  Since patient never seems to have a headache she is concerned that hypertension is causing her headache.  She has taken 2 doses of the spironolactone HCTZ and felt a little orthostatic next day but did not see significant improvement in her blood pressure.  She denies chest pain or palpitations.  No vision changes.  No shortness of breath or cough.    She has been on semaglutide and not had significant weight loss and no significant side effects but has lost weight with intermittent fasting recently.  She feels very good on an intermittent fasting diet, has much more energy and plans to continue and will stop Wegovy when runs out to see if she can maintain her weight off Wegovy with fasting.     Objective   Vital Signs:  /64 (BP Location: Left arm, Patient Position: Sitting, Cuff Size: Large Adult)   Pulse 79   Ht 160 cm (63\")   Wt 99 kg (218 lb 4.8 oz)   SpO2 96%   BMI 38.67 kg/m²   Estimated body mass index is 38.67 kg/m² as calculated from the following:    Height as of this encounter: 160 cm (63\").    Weight as of this encounter: 99 kg (218 lb 4.8 oz).               Physical Exam  Vitals and nursing note reviewed.   Constitutional:       General: She is not in acute distress.     Appearance: She is well-developed. She is not ill-appearing or diaphoretic.   HENT:      Head: Normocephalic and atraumatic.   Eyes:      General:         Right eye: No discharge.         Left eye: No discharge.      Conjunctiva/sclera: Conjunctivae normal.   Cardiovascular:      Rate and Rhythm: Normal rate and regular " rhythm.   Pulmonary:      Effort: Pulmonary effort is normal.      Breath sounds: Normal breath sounds.   Abdominal:      General: Bowel sounds are normal.      Palpations: Abdomen is soft.   Musculoskeletal:         General: No deformity.      Comments: Gait smooth and steady   Skin:     General: Skin is warm and dry.   Neurological:      Mental Status: She is alert and oriented to person, place, and time.      Motor: Tremor present.   Psychiatric:         Mood and Affect: Mood normal.         Behavior: Behavior normal.        Result Review :                     Assessment and Plan     Diagnoses and all orders for this visit:    1. History of high blood pressure (Primary)    Patient has extremely high blood pressure noted at home.  She reports she has an [employer] wrist and arm cuff which have both given same readings.  Blood pressure here today was taken several times in both arms and consistent with what was noted on triage.  Patient has been reassured that I do not think her headache is caused by high blood pressure.  Will have her come back for nurse visit to check cuff against ours to make sure reading properly.  Proper technique For home blood pressure readings reinforced.         Follow Up     No follow-ups on file.  Patient was given instructions and counseling regarding her condition or for health maintenance advice. Please see specific information pulled into the AVS if appropriate.         Answers submitted by the patient for this visit:  Primary Reason for Visit (Submitted on 1/29/2024)  What is the primary reason for your visit?: High Blood Pressure

## 2024-03-10 RX ORDER — EZETIMIBE 10 MG/1
10 TABLET ORAL DAILY
Qty: 90 TABLET | Refills: 1 | Status: CANCELLED | OUTPATIENT
Start: 2024-03-10

## 2024-03-10 RX ORDER — TRAZODONE HYDROCHLORIDE 150 MG/1
150 TABLET ORAL NIGHTLY
Qty: 90 TABLET | Refills: 1 | Status: CANCELLED | OUTPATIENT
Start: 2024-03-10

## 2024-03-11 NOTE — TELEPHONE ENCOUNTER
Rx Refill Note  Requested Prescriptions     Pending Prescriptions Disp Refills    ezetimibe (ZETIA) 10 MG tablet 90 tablet 1     Sig: Take 1 tablet by mouth Daily.    traZODone (DESYREL) 150 MG tablet 90 tablet 1     Sig: Take 1 tablet by mouth Every Night.      Last office visit with prescribing clinician: 1/30/2024   Last telemedicine visit with prescribing clinician: Visit date not found   Next office visit with prescribing clinician: Visit date not found                         Would you like a call back once the refill request has been completed: [] Yes [] No    If the office needs to give you a call back, can they leave a voicemail: [] Yes [] No    Julian Powell MA  03/11/24, 11:40 EDT

## 2024-03-12 RX ORDER — TRAZODONE HYDROCHLORIDE 150 MG/1
150 TABLET ORAL NIGHTLY
Qty: 90 TABLET | Refills: 1 | Status: SHIPPED | OUTPATIENT
Start: 2024-03-12

## 2024-03-12 RX ORDER — EZETIMIBE 10 MG/1
10 TABLET ORAL DAILY
Qty: 90 TABLET | Refills: 1 | Status: SHIPPED | OUTPATIENT
Start: 2024-03-12

## 2024-03-15 NOTE — TELEPHONE ENCOUNTER
Rx Refill Note  Requested Prescriptions     Pending Prescriptions Disp Refills    spironolactone-hydrochlorothiazide (ALDACTAZIDE) 25-25 MG tablet        Last office visit with prescribing clinician: 1/30/2024   Last telemedicine visit with prescribing clinician: Visit date not found   Next office visit with prescribing clinician: Visit date not found                         Would you like a call back once the refill request has been completed: [] Yes [] No    If the office needs to give you a call back, can they leave a voicemail: [] Yes [] No    Mercedes Steward Rep  03/15/24, 10:09 EDT

## 2024-03-18 RX ORDER — SPIRONOLACTONE AND HYDROCHLOROTHIAZIDE 25; 25 MG/1; MG/1
1 TABLET ORAL DAILY
Qty: 90 TABLET | Refills: 0 | Status: SHIPPED | OUTPATIENT
Start: 2024-03-18

## 2024-04-02 ENCOUNTER — OFFICE VISIT (OUTPATIENT)
Dept: FAMILY MEDICINE CLINIC | Facility: CLINIC | Age: 59
End: 2024-04-02
Payer: COMMERCIAL

## 2024-04-02 VITALS
HEART RATE: 73 BPM | HEIGHT: 63 IN | OXYGEN SATURATION: 98 % | TEMPERATURE: 97.6 F | SYSTOLIC BLOOD PRESSURE: 118 MMHG | BODY MASS INDEX: 39.57 KG/M2 | WEIGHT: 223.3 LBS | DIASTOLIC BLOOD PRESSURE: 80 MMHG

## 2024-04-02 DIAGNOSIS — Z76.89 ENCOUNTER FOR WEIGHT MANAGEMENT: Primary | ICD-10-CM

## 2024-04-02 DIAGNOSIS — E66.9 OBESITY (BMI 30-39.9): ICD-10-CM

## 2024-04-02 PROCEDURE — 99214 OFFICE O/P EST MOD 30 MIN: CPT | Performed by: NURSE PRACTITIONER

## 2024-04-02 RX ORDER — SEMAGLUTIDE 0.5 MG/.5ML
0.5 INJECTION, SOLUTION SUBCUTANEOUS WEEKLY
Qty: 3 ML | Refills: 0 | Status: SHIPPED | OUTPATIENT
Start: 2024-04-02

## 2024-04-02 NOTE — PROGRESS NOTES
"Chief Complaint  Encounter for weight management    Subjective        Lizy Bryan presents to [facility]  History of Present Illness    History of Present Illness  The patient is a 57-year-old female who is here for follow-up on Wegovy.    The patient had to gradually discontinue Wegovy in 01/2024, resulting in a weight gain of approximately 5 pounds. Despite this, she has successfully reduced her weight from 218 pounds to 214 pounds. Her insurance no longer covers Wegovy.  She pays a substantial amount for premiums, however, certain medications will not be covered. Wegovy has been effective in managing her condition, and she plans to pay out-of-pocket for this medication. She was last prescribed Wegovy 2.4 mg, which she has been off for the past 2 months. She reports no other health concerns at this time.  She would like to use compounding pharmacy.  She feels that the benefits of compounded medication outweigh risks.     Denies other health concerns.        Objective   Vital Signs:  /80   Pulse 73   Temp 97.6 °F (36.4 °C) (Temporal)   Ht 160 cm (63\")   Wt 101 kg (223 lb 4.8 oz)   SpO2 98%   BMI 39.56 kg/m²   Estimated body mass index is 39.56 kg/m² as calculated from the following:    Height as of this encounter: 160 cm (63\").    Weight as of this encounter: 101 kg (223 lb 4.8 oz).               Physical Exam  Vitals and nursing note reviewed.   Constitutional:       General: She is not in acute distress.     Appearance: She is well-developed. She is obese. She is not ill-appearing or diaphoretic.   HENT:      Head: Normocephalic and atraumatic.   Eyes:      General:         Right eye: No discharge.         Left eye: No discharge.      Conjunctiva/sclera: Conjunctivae normal.   Cardiovascular:      Rate and Rhythm: Normal rate and regular rhythm.      Heart sounds: Normal heart sounds.   Pulmonary:      Effort: Pulmonary effort is normal.      Breath sounds: Normal " breath sounds.   Abdominal:      General: Bowel sounds are normal.      Palpations: Abdomen is soft.      Tenderness: There is no abdominal tenderness.   Musculoskeletal:         General: No deformity.      Comments: Gait smooth and steady   Skin:     General: Skin is warm and dry.   Neurological:      Mental Status: She is alert and oriented to person, place, and time.      Motor: Tremor present.   Psychiatric:         Mood and Affect: Mood normal.         Behavior: Behavior normal.          Physical Exam       Result Review :            Results                  Assessment and Plan     Diagnoses and all orders for this visit:    1. Encounter for weight management (Primary)    2. Obesity (BMI 30-39.9)  -     Semaglutide-Weight Management (Wegovy) 0.5 MG/0.5ML solution auto-injector; Inject 0.5 mL under the skin into the appropriate area as directed 1 (One) Time Per Week.  Dispense: 3 mL; Refill: 0        Assessment & Plan  1. Obesity-3 worsened off Wegovy.  The patient has experienced a weight gain of approximately 5 pounds since 01/2024. A prescription for Wegovy 0.5 mg has been issued for a duration of 1 month. The patient has been advised to report any adverse effects to the 0.5 mg dosage. Additionally, she has been advised to undergo a physical examination in 6 months. We discussed risks of compounded medication and pt willing to accept risk.  We agree that benefits of compounded medication probably outweigh risks.     Follow-up  The patient is scheduled for a follow-up visit in 3 months.            Follow Up     Return in about 6 months (around 10/2/2024) for Annual physical.  Patient was given instructions and counseling regarding her condition or for health maintenance advice. Please see specific information pulled into the AVS if appropriate.    Patient or patient representative verbalized consent for the use of Ambient Listening during the visit with  NORA Felix for chart documentation. 4/2/2024   08:42 EDT      Answers submitted by the patient for this visit:  Other (Submitted on 3/26/2024)  Please describe your symptoms.: Wegovy update  Have you had these symptoms before?: Yes  How long have you been having these symptoms?: 1-2 weeks  Primary Reason for Visit (Submitted on 3/26/2024)  What is the primary reason for your visit?: Other

## 2024-04-25 RX ORDER — SEMAGLUTIDE 1 MG/.5ML
1 INJECTION, SOLUTION SUBCUTANEOUS WEEKLY
Qty: 3 ML | Refills: 0 | Status: SHIPPED | OUTPATIENT
Start: 2024-04-25

## 2024-05-27 ENCOUNTER — PATIENT MESSAGE (OUTPATIENT)
Dept: FAMILY MEDICINE CLINIC | Facility: CLINIC | Age: 59
End: 2024-05-27
Payer: COMMERCIAL

## 2024-05-28 RX ORDER — SEMAGLUTIDE 1.7 MG/.75ML
1.7 INJECTION, SOLUTION SUBCUTANEOUS WEEKLY
Qty: 3 ML | Refills: 0 | Status: SHIPPED | OUTPATIENT
Start: 2024-05-28 | End: 2024-05-31 | Stop reason: SDUPTHER

## 2024-05-28 NOTE — TELEPHONE ENCOUNTER
Rx Refill Note  Requested Prescriptions     Pending Prescriptions Disp Refills    Semaglutide-Weight Management (Wegovy) 1.7 MG/0.75ML solution auto-injector 3 mL 0     Sig: Inject 0.75 mL under the skin into the appropriate area as directed 1 (One) Time Per Week.      Last office visit with prescribing clinician: 4/2/2024   Last telemedicine visit with prescribing clinician: Visit date not found   Next office visit with prescribing clinician: 10/3/2024                         Would you like a call back once the refill request has been completed: [] Yes [] No    If the office needs to give you a call back, can they leave a voicemail: [] Yes [] No    Yahaira Luu LPN  05/28/24, 11:02 EDT

## 2024-05-29 ENCOUNTER — TELEPHONE (OUTPATIENT)
Dept: FAMILY MEDICINE CLINIC | Facility: CLINIC | Age: 59
End: 2024-05-29
Payer: COMMERCIAL

## 2024-05-29 NOTE — TELEPHONE ENCOUNTER
Pharmacy calling to receive verbal okay for Wegovy to be compounded. Pharmacy also says to avoid having to call back prescription can be sent back over with writing on it giving the okay.

## 2024-05-31 DIAGNOSIS — E66.01 MORBID OBESITY WITH BMI OF 40.0-44.9, ADULT: Primary | ICD-10-CM

## 2024-05-31 RX ORDER — SEMAGLUTIDE 1.7 MG/.75ML
1.7 INJECTION, SOLUTION SUBCUTANEOUS WEEKLY
Qty: 3 ML | Refills: 0 | Status: SHIPPED | OUTPATIENT
Start: 2024-05-31

## 2024-06-08 DIAGNOSIS — F41.1 GENERALIZED ANXIETY DISORDER: ICD-10-CM

## 2024-06-08 DIAGNOSIS — F33.1 MODERATE EPISODE OF RECURRENT MAJOR DEPRESSIVE DISORDER: ICD-10-CM

## 2024-06-10 RX ORDER — SPIRONOLACTONE AND HYDROCHLOROTHIAZIDE 25; 25 MG/1; MG/1
1 TABLET ORAL DAILY
Qty: 90 TABLET | Refills: 0 | Status: SHIPPED | OUTPATIENT
Start: 2024-06-10

## 2024-06-10 RX ORDER — FLUOXETINE HYDROCHLORIDE 20 MG/1
20 CAPSULE ORAL DAILY
Qty: 90 CAPSULE | Refills: 1 | Status: SHIPPED | OUTPATIENT
Start: 2024-06-10

## 2024-06-10 NOTE — TELEPHONE ENCOUNTER
Rx Refill Note  Requested Prescriptions     Pending Prescriptions Disp Refills    FLUoxetine (PROzac) 20 MG capsule 90 capsule 1     Sig: Take 1 capsule by mouth Daily.    spironolactone-hydrochlorothiazide (ALDACTAZIDE) 25-25 MG tablet 90 tablet 1     Sig: Take 1 tablet by mouth Daily.      Last office visit with prescribing clinician: 4/2/2024   Last telemedicine visit with prescribing clinician: Visit date not found   Next office visit with prescribing clinician: 10/3/2024                         Would you like a call back once the refill request has been completed: [] Yes [] No    If the office needs to give you a call back, can they leave a voicemail: [] Yes [] No    Mercedes Steward Rep  06/10/24, 10:54 EDT

## 2024-06-19 ENCOUNTER — TELEPHONE (OUTPATIENT)
Dept: FAMILY MEDICINE CLINIC | Facility: CLINIC | Age: 59
End: 2024-06-19

## 2024-06-19 NOTE — TELEPHONE ENCOUNTER
Hub staff attempted to follow warm transfer process and was unsuccessful     Caller: Lizy Bryan    Relationship to patient: Self    Best call back number: [phone]    Patient is needing: PATIENT WAS TOLD TO COME IN AND DO LABS. PLEASE REACH OUT TO SCHEDULE.

## 2024-06-20 RX ORDER — ROSUVASTATIN CALCIUM 5 MG/1
5 TABLET, COATED ORAL DAILY
Qty: 90 TABLET | Refills: 1 | Status: SHIPPED | OUTPATIENT
Start: 2024-06-20

## 2024-06-26 NOTE — TELEPHONE ENCOUNTER
Rx Refill Note  Requested Prescriptions     Pending Prescriptions Disp Refills    Semaglutide-Weight Management (Wegovy) 1.7 MG/0.75ML solution auto-injector 3 mL 0     Sig: Inject 0.75 mL under the skin into the appropriate area as directed 1 (One) Time Per Week.      Last office visit with prescribing clinician: 4/2/2024   Last telemedicine visit with prescribing clinician: Visit date not found   Next office visit with prescribing clinician: 10/3/2024                         Would you like a call back once the refill request has been completed: [] Yes [] No    If the office needs to give you a call back, can they leave a voicemail: [] Yes [] No    Mercedes Steward Rep  06/26/24, 15:51 EDT

## 2024-06-27 RX ORDER — SEMAGLUTIDE 1.7 MG/.75ML
1.7 INJECTION, SOLUTION SUBCUTANEOUS WEEKLY
Qty: 3 ML | Refills: 0 | Status: SHIPPED | OUTPATIENT
Start: 2024-06-27

## 2024-07-15 ENCOUNTER — TELEPHONE (OUTPATIENT)
Dept: FAMILY MEDICINE CLINIC | Facility: CLINIC | Age: 59
End: 2024-07-15

## 2024-07-15 RX ORDER — SEMAGLUTIDE 1.7 MG/.75ML
1.7 INJECTION, SOLUTION SUBCUTANEOUS WEEKLY
Qty: 3 ML | Refills: 0 | Status: CANCELLED | OUTPATIENT
Start: 2024-07-15

## 2024-07-15 NOTE — TELEPHONE ENCOUNTER
"    Caller: Lizy Bryan    Relationship: Self    Best call back number: [phone]     Requested Prescriptions:   Requested Prescriptions     Pending Prescriptions Disp Refills    Semaglutide-Weight Management (Wegovy) 1.7 MG/0.75ML solution auto-injector 3 mL 0     Sig: Inject 0.75 mL under the skin into the appropriate area as directed 1 (One) Time Per Week.        Pharmacy where request should be sent: [facility] \"COMPOUNDS ONLY\" - [address] [phone] [facility] [phone]      Last office visit with prescribing clinician: 4/2/2024   Last telemedicine visit with prescribing clinician: Visit date not found   Next office visit with prescribing clinician: 10/3/2024     Additional details provided by patient: MEDICATION HAS TO BE SENT TO THE PHARMACY LISTED ABOVE BECAUSE IT IS MORE AFFORDABLE.    Does the patient have less than a 3 day supply:  [x] Yes  [] No    Would you like a call back once the refill request has been completed: [x] Yes [] No    If the office needs to give you a call back, can they leave a voicemail: [x] Yes [] No    Mercedes Sorenson Rep   07/15/24 13:21 EDT           "

## 2024-07-16 RX ORDER — SEMAGLUTIDE 1.7 MG/.75ML
1.7 INJECTION, SOLUTION SUBCUTANEOUS WEEKLY
Qty: 3 ML | Refills: 0 | Status: SHIPPED | OUTPATIENT
Start: 2024-07-16

## 2024-07-16 NOTE — TELEPHONE ENCOUNTER
Rx Refill Note  Requested Prescriptions     Pending Prescriptions Disp Refills    Semaglutide-Weight Management (Wegovy) 1.7 MG/0.75ML solution auto-injector 3 mL 0     Sig: Inject 0.75 mL under the skin into the appropriate area as directed 1 (One) Time Per Week.      Last office visit with prescribing clinician: 4/2/2024   Last telemedicine visit with prescribing clinician: Visit date not found   Next office visit with prescribing clinician: 10/3/2024                         Would you like a call back once the refill request has been completed: [] Yes [] No    If the office needs to give you a call back, can they leave a voicemail: [] Yes [] No    Mercedes Steward Rep  07/16/24, 08:41 EDT

## 2024-09-24 RX ORDER — PANTOPRAZOLE SODIUM 40 MG/1
40 TABLET, DELAYED RELEASE ORAL DAILY
Qty: 90 TABLET | Refills: 3 | OUTPATIENT
Start: 2024-09-24

## 2024-09-26 RX ORDER — SPIRONOLACTONE AND HYDROCHLOROTHIAZIDE 25; 25 MG/1; MG/1
1 TABLET ORAL DAILY
Qty: 90 TABLET | Refills: 0 | Status: SHIPPED | OUTPATIENT
Start: 2024-09-26

## 2024-10-18 ENCOUNTER — PATIENT MESSAGE (OUTPATIENT)
Dept: FAMILY MEDICINE CLINIC | Facility: CLINIC | Age: 59
End: 2024-10-18
Payer: COMMERCIAL

## 2024-10-18 DIAGNOSIS — Z79.899 MEDICATION MANAGEMENT: ICD-10-CM

## 2024-10-18 DIAGNOSIS — R73.09 ELEVATED GLUCOSE: ICD-10-CM

## 2024-10-18 DIAGNOSIS — Z13.220 SCREENING FOR LIPID DISORDERS: ICD-10-CM

## 2024-10-18 DIAGNOSIS — Z01.419 WELL WOMAN EXAM: Primary | ICD-10-CM

## 2024-10-22 ENCOUNTER — LAB (OUTPATIENT)
Dept: LAB | Facility: HOSPITAL | Age: 59
End: 2024-10-22
Payer: COMMERCIAL

## 2024-10-22 PROCEDURE — 83036 HEMOGLOBIN GLYCOSYLATED A1C: CPT | Performed by: NURSE PRACTITIONER

## 2024-10-22 PROCEDURE — 80061 LIPID PANEL: CPT | Performed by: NURSE PRACTITIONER

## 2024-10-22 PROCEDURE — 85027 COMPLETE CBC AUTOMATED: CPT | Performed by: NURSE PRACTITIONER

## 2024-10-22 PROCEDURE — 80053 COMPREHEN METABOLIC PANEL: CPT | Performed by: NURSE PRACTITIONER

## 2024-10-24 ENCOUNTER — OFFICE VISIT (OUTPATIENT)
Dept: FAMILY MEDICINE CLINIC | Facility: CLINIC | Age: 59
End: 2024-10-24
Payer: COMMERCIAL

## 2024-10-24 VITALS
HEART RATE: 67 BPM | RESPIRATION RATE: 14 BRPM | TEMPERATURE: 97 F | BODY MASS INDEX: 43.75 KG/M2 | SYSTOLIC BLOOD PRESSURE: 132 MMHG | HEIGHT: 63 IN | DIASTOLIC BLOOD PRESSURE: 80 MMHG | WEIGHT: 246.9 LBS | OXYGEN SATURATION: 98 %

## 2024-10-24 DIAGNOSIS — Z00.00 ROUTINE GENERAL MEDICAL EXAMINATION AT A HEALTH CARE FACILITY: Primary | ICD-10-CM

## 2024-10-24 DIAGNOSIS — E78.00 ELEVATED CHOLESTEROL: ICD-10-CM

## 2024-10-24 DIAGNOSIS — Z79.899 MEDICATION MANAGEMENT: ICD-10-CM

## 2024-10-24 DIAGNOSIS — E66.01 MORBID OBESITY WITH BMI OF 40.0-44.9, ADULT: ICD-10-CM

## 2024-10-24 DIAGNOSIS — K21.9 GASTROESOPHAGEAL REFLUX DISEASE, UNSPECIFIED WHETHER ESOPHAGITIS PRESENT: ICD-10-CM

## 2024-10-24 DIAGNOSIS — R73.09 ELEVATED GLUCOSE: ICD-10-CM

## 2024-10-24 DIAGNOSIS — R35.0 FREQUENCY OF MICTURITION: ICD-10-CM

## 2024-10-24 DIAGNOSIS — E78.00 PURE HYPERCHOLESTEROLEMIA: ICD-10-CM

## 2024-10-24 PROCEDURE — 99396 PREV VISIT EST AGE 40-64: CPT | Performed by: NURSE PRACTITIONER

## 2024-10-24 PROCEDURE — 90471 IMMUNIZATION ADMIN: CPT | Performed by: NURSE PRACTITIONER

## 2024-10-24 PROCEDURE — 90656 IIV3 VACC NO PRSV 0.5 ML IM: CPT | Performed by: NURSE PRACTITIONER

## 2024-10-24 PROCEDURE — 99214 OFFICE O/P EST MOD 30 MIN: CPT | Performed by: NURSE PRACTITIONER

## 2024-10-24 NOTE — PROGRESS NOTES
"Chief Complaint  Annual Exam    Subjective        Lizy Bryan presents to [facility]  History of Present Illness    History of Present Illness  The patient presents for a physical exam.    She reports overall good health but expresses a desire to lose more weight. Despite issues with her medication being sent to the wrong pharmacy, she has successfully reduced her weight from 248 to 210 pounds. Her diet includes cucumbers, boiled eggs, and vinegar, though she admits to cravings for salty foods and occasional sweets. Recently, she has been less active, which she believes has contributed to weight gain. She also mentions fluid retention and a high protein intake.    She has not started taking the prescribed statin, opting instead for krill oil, which she believes has improved her cholesterol levels and alleviated hip pain. She has concerns about potential liver issues due to past elevated liver markers.    She has been wheezing for the past 3 to 4 days and takes Zyrtec daily for allergies. Occasionally, she experiences a tickling sensation in her throat at night, leading to a dry cough. She has dentures and visits the dentist every 3 to 4 years. She has no difficulty swallowing but sometimes chokes on water. She has had an endoscopy in the past and takes pantoprazole daily for acid reflux.    She is interested in a urinalysis to rule out a urinary tract infection before her upcoming cruise.    RANDY-7 Score: RANDY 7 Total Score: 4  PHQ-9 Total Score: 4    FAMILY HISTORY  Her mother and father have diabetes.       Objective   Vital Signs:  /80   Pulse 67   Temp 97 °F (36.1 °C) (Temporal)   Resp 14   Ht 160 cm (63\")   Wt 112 kg (246 lb 14.4 oz)   SpO2 98%   BMI 43.74 kg/m²   Estimated body mass index is 43.74 kg/m² as calculated from the following:    Height as of this encounter: 160 cm (63\").    Weight as of this encounter: 112 kg (246 lb 14.4 oz).               Physical " Exam  Vitals and nursing note reviewed.   Constitutional:       General: She is not in acute distress.     Appearance: She is well-developed. She is obese. She is not ill-appearing.   HENT:      Head: Normocephalic and atraumatic.      Right Ear: Tympanic membrane, ear canal and external ear normal.      Left Ear: Tympanic membrane, ear canal and external ear normal.      Mouth/Throat:      Mouth: Mucous membranes are moist.      Pharynx: Uvula midline. No posterior oropharyngeal erythema.   Eyes:      General: No scleral icterus.        Right eye: No discharge.         Left eye: No discharge.      Conjunctiva/sclera: Conjunctivae normal.      Pupils: Pupils are equal, round, and reactive to light.   Neck:      Thyroid: No thyromegaly.   Cardiovascular:      Rate and Rhythm: Normal rate and regular rhythm.      Heart sounds: Normal heart sounds.   Pulmonary:      Effort: Pulmonary effort is normal.      Breath sounds: Normal breath sounds.   Abdominal:      General: Bowel sounds are normal. There is no distension.      Palpations: Abdomen is soft.      Tenderness: There is no abdominal tenderness.   Musculoskeletal:         General: No deformity.      Cervical back: Neck supple.      Comments: Gait smooth and steady   Lymphadenopathy:      Cervical: No cervical adenopathy.   Skin:     General: Skin is warm and dry.   Neurological:      Mental Status: She is alert and oriented to person, place, and time.      Motor: Tremor present.   Psychiatric:         Mood and Affect: Mood normal.         Behavior: Behavior normal.         Thought Content: Thought content normal.          Physical Exam       Result Review :            Results  Laboratory Studies  A1c slightly increased from 10 months ago, average blood sugar in the 110s. CBC normal, no anemia. Glucose 113. Kidney function normal. Electrolytes normal. Liver enzymes slightly increased.                Assessment and Plan     Diagnoses and all orders for this  visit:    1. Routine general medical examination at a health care facility (Primary)  -     Comprehensive Metabolic Panel; Future  -     Hemoglobin A1c; Future  -     Lipid Panel With LDL / HDL Ratio; Future    2. Pure hypercholesterolemia  -     CT Cardiac Calcium Score Without Dye; Future  -     Lipid Panel With LDL / HDL Ratio; Future    3. Frequency of micturition  -     Urine Culture - Urine, Urine, Random Void    4. Morbid obesity with BMI of 40.0-44.9, adult  -     Semaglutide-Weight Management 0.25 MG/0.5ML solution auto-injector; Inject 0.5 mL under the skin into the appropriate area as directed 1 (One) Time Per Week.  Dispense: 2 mL; Refill: 0    5. Elevated cholesterol    6. Gastroesophageal reflux disease, unspecified whether esophagitis present    7. Elevated glucose  -     Hemoglobin A1c; Future    8. Medication management  -     Comprehensive Metabolic Panel; Future    Other orders  -     Fluzone >6mos        Assessment & Plan  1. Weight Management for obesity-has had a weight gain since last visit of appr 20 lbs  She has been off Wegovy since July due to pharmacy issues. She will restart Wegov-needs to restart at lowest dose for tolerability. The prescription will be sent to [facility] pharmacy. She is advised to consume small, frequent meals to prevent excessive hunger and avoid overeating. She is aware of risks, cautions of medication, compounds and wishes to proceed.  She has no contraindications to GLP-1    2. Hyperlipidemia.  Her cholesterol levels have improved with the use of krill oil, and she is hesitant to start a statin. She plans to increase her krill oil dose to 2400 mg daily and recheck her blood work in 3 months. If cholesterol levels do not improve further, she will consider starting a statin. A calcium score CT scan will be ordered to assess her cardiovascular risk.    3. Prediabetes.  Her A1C is slightly elevated but remains under the prediabetic range. She is advised to continue  her current diet and exercise regimen. The Wegovy may help lower her blood sugar levels.    4. Hou's Esophagus.  She has Hou's esophagus and takes pantoprazole daily. She is advised to follow up with her gastroenterologist for a repeat endoscopy, as it has been about 2 years since her last scope, carol since she has noted some recent choking.  No evidence of aspiration. If no esophageal issues will get swallow study    5. Wheezing.  She reports wheezing in her throat for the past 3-4 days, likely due to mild drainage. She will continue taking Zyrtec daily. BS CTAB    6. Health Maintenance.  An influenza vaccine will be administered today. A urinalysis will be conducted to rule out a urinary tract infection before her upcoming cruise.  BP controlled, normal renal and electrolytes, continue current meds. No recent COPD exacerbation.  Doing well on current prozac    Follow-up  Return in 3 months for follow-up.            Follow Up     No follow-ups on file.  Patient was given instructions and counseling regarding her condition or for health maintenance advice. Please see specific information pulled into the AVS if appropriate.    Patient or patient representative verbalized consent for the use of Ambient Listening during the visit with  NORA Felix for chart documentation. 11/4/2024  15:04 EST

## 2024-11-27 ENCOUNTER — APPOINTMENT (OUTPATIENT)
Dept: CT IMAGING | Facility: HOSPITAL | Age: 59
End: 2024-11-27

## 2024-11-30 ENCOUNTER — HOSPITAL ENCOUNTER (OUTPATIENT)
Facility: HOSPITAL | Age: 59
Discharge: HOME | End: 2024-11-30

## 2024-11-30 DIAGNOSIS — E78.00 PURE HYPERCHOLESTEROLEMIA: ICD-10-CM

## 2024-11-30 PROCEDURE — 75571 CT HRT W/O DYE W/CA TEST: CPT

## 2024-12-03 ENCOUNTER — TELEPHONE (OUTPATIENT)
Dept: FAMILY MEDICINE CLINIC | Facility: CLINIC | Age: 59
End: 2024-12-03

## 2024-12-03 DIAGNOSIS — F33.1 MODERATE EPISODE OF RECURRENT MAJOR DEPRESSIVE DISORDER: ICD-10-CM

## 2024-12-03 DIAGNOSIS — E66.01 MORBID OBESITY WITH BMI OF 40.0-44.9, ADULT: ICD-10-CM

## 2024-12-03 DIAGNOSIS — F41.1 GENERALIZED ANXIETY DISORDER: ICD-10-CM

## 2024-12-03 NOTE — TELEPHONE ENCOUNTER
Report not noted in chart at this time. Please contact and advise waiting on radiology to read report.

## 2024-12-03 NOTE — TELEPHONE ENCOUNTER
Caller: Lizy Bryan    Relationship: Self    Best call back number:     [phone]       Caller requesting test results: CT SCAN RESULTS    What test was performed: CT SCAN OF THE HEART    When was the test performed: 11/30/2024    Where was the test performed: [facility]    Additional notes:     PATIENT WOULD LIKE A CALL BACK WITH THE RESULTS PLEASE

## 2024-12-04 NOTE — TELEPHONE ENCOUNTER
Called PT and informed waiting on notes from pcp. Informed pt someone will reach out with results.

## 2024-12-04 NOTE — TELEPHONE ENCOUNTER
Rx Refill Note  Requested Prescriptions     Pending Prescriptions Disp Refills    FLUoxetine (PROzac) 20 MG capsule 90 capsule 1     Sig: Take 1 capsule by mouth Daily.      Last office visit with prescribing clinician: 10/24/2024   Last telemedicine visit with prescribing clinician: Visit date not found   Next office visit with prescribing clinician: 12/3/2024                         Would you like a call back once the refill request has been completed: [] Yes [] No    If the office needs to give you a call back, can they leave a voicemail: [] Yes [] No    Mercedes Steward Rep  12/04/24, 08:25 EST

## 2024-12-05 NOTE — TELEPHONE ENCOUNTER
Rx Refill Note  Requested Prescriptions     Pending Prescriptions Disp Refills    Semaglutide-Weight Management 1 MG/0.5ML solution auto-injector 2 mL 0     Sig: Inject 0.5 mL under the skin into the appropriate area as directed 1 (One) Time Per Week.      Last office visit with prescribing clinician: 10/24/2024   Last telemedicine visit with prescribing clinician: Visit date not found   Next office visit with prescribing clinician: 12/3/2024                         Would you like a call back once the refill request has been completed: [] Yes [] No    If the office needs to give you a call back, can they leave a voicemail: [] Yes [] No    Loly Hernández MA  12/05/24, 10:02 EST

## 2024-12-05 NOTE — TELEPHONE ENCOUNTER
I do not think this is the correct dose?  She was previously on 0.25 mg? and the pharmacy was never changed to compound pharmacy where she has been getting her prescription which would have resulted in a delay and poor patient care-SW

## 2024-12-06 ENCOUNTER — TELEMEDICINE (OUTPATIENT)
Dept: FAMILY MEDICINE CLINIC | Facility: CLINIC | Age: 59
End: 2024-12-06
Payer: COMMERCIAL

## 2024-12-06 DIAGNOSIS — E78.00 PURE HYPERCHOLESTEROLEMIA: ICD-10-CM

## 2024-12-06 DIAGNOSIS — R91.1 LUNG NODULE SEEN ON IMAGING STUDY: ICD-10-CM

## 2024-12-06 DIAGNOSIS — R93.1 ELEVATED CORONARY ARTERY CALCIUM SCORE: Primary | ICD-10-CM

## 2024-12-06 PROCEDURE — 99214 OFFICE O/P EST MOD 30 MIN: CPT | Performed by: NURSE PRACTITIONER

## 2024-12-06 RX ORDER — ASPIRIN 81 MG/1
81 TABLET ORAL DAILY
Start: 2024-12-06

## 2024-12-06 NOTE — PROGRESS NOTES
"Chief Complaint  Imaging Only (Discuss ct results)    Subjective        Lizy Bryan presents to [facility]  History of Present Illness    History of Present Illness  Patient scheduled a video visit for elevated calcium score which was done due to hyperlipidemia and patient was concerned about feeling bad on statin and had instead opted for krill oil.  She did have an improvement in her cholesterol on Krill oil but still had elevated LDL and total cholesterol so we chose to do calcium score which was 675.  Patient's lab results yesterday and has started rosuvastatin 5 mg.  She will take this daily and stop krill oil.  She will also start an 81 mg baby aspirin with food.  She had a stress test in February 2023 that was essentially normal.  She also had echo that was grossly normal.  She had these done for fluttering of her heart but no pain.  She noted these previously just after she ate but now has them more often but she does not notice them with exertion but is willing to pay closer attention.  No unusual fatigue, chest pain or dyspnea with exertion currently.    She also had a pulmonary nodule found incidentally on calcium score.  Previous smoking history x 35 years.  Recommendations are to have f/u CT scan in 12 months for stability.  Denies any cough.       Objective   Vital Signs:  There were no vitals taken for this visit.  Estimated body mass index is 43.74 kg/m² as calculated from the following:    Height as of 10/24/24: 160 cm (63\").    Weight as of 10/24/24: 112 kg (246 lb 14.4 oz).               Physical Exam     Physical Exam   Limited by video visit.  She is well appearing and does not seem to be distressed.  She seems alert and oriented and her mood and affect are normal, good historian of medical history.  No cough or dyspnea appreciated, able to complete sentences without problem.       Result Review :    The following data was reviewed by: NORA Felix on " 12/06/2024:        Results  Stress test February 2023 negative  Echo February of 2023 which was also grossly normal.                Assessment and Plan     Diagnoses and all orders for this visit:    1. Elevated coronary artery calcium score (Primary)  -     aspirin 81 MG EC tablet; Take 1 tablet by mouth Daily.    2. Pure hypercholesterolemia    3. Lung nodule seen on imaging study  -     CT Chest With Contrast; Future        Assessment & Plan  For elevated calcium score patient will start statin.  Discussed lipids and goals.  Since questionable tolerability we will start at 5 mg and I have an appointment with her in about 6 weeks and we will up her statin if she is tolerating well.  Goal for LDL is 70 or less.  She will start baby aspirin with food.  She is aware that any chest pain needs ER visit.  May need additional stress test but 1 just over a year ago was negative and no angina.  Will have her stop the krill oil for now.    Discussed lung nodule and she is at higher risk due to previous smoking history.  Will get 12-month CT scan for stability.  She is agreeable with this plan.     [employer] video link used for visit with good A/V quality from mobile device in office and patient noted at home.       I spent 25 minutes caring for Lizy on this date of service. This time includes time spent by me in the following activities:preparing for the visit, reviewing tests, performing a medically appropriate examination and/or evaluation , counseling and educating the patient/family/caregiver, ordering medications, tests, or procedures, and documenting information in the medical record  Follow Up     No follow-ups on file.  Patient was given instructions and counseling regarding her condition or for health maintenance advice. Please see specific information pulled into the AVS if appropriate.

## 2024-12-17 RX ORDER — PANTOPRAZOLE SODIUM 40 MG/1
40 TABLET, DELAYED RELEASE ORAL DAILY
Qty: 90 TABLET | Refills: 3 | Status: CANCELLED | OUTPATIENT
Start: 2024-12-17

## 2024-12-17 NOTE — TELEPHONE ENCOUNTER
Rx Refill Note  Requested Prescriptions     Pending Prescriptions Disp Refills    traZODone (DESYREL) 150 MG tablet 90 tablet 1     Sig: Take 1 tablet by mouth Every Night.      Last office visit with prescribing clinician: 10/24/2024   Last telemedicine visit with prescribing clinician: 12/6/2024   Next office visit with prescribing clinician: 12/17/2024                         Would you like a call back once the refill request has been completed: [] Yes [] No    If the office needs to give you a call back, can they leave a voicemail: [] Yes [] No    Mercedes Steward Rep  12/17/24, 16:26 EST

## 2024-12-19 RX ORDER — TRAZODONE HYDROCHLORIDE 150 MG/1
150 TABLET ORAL NIGHTLY
Qty: 90 TABLET | Refills: 1 | Status: SHIPPED | OUTPATIENT
Start: 2024-12-19

## 2025-01-04 ENCOUNTER — PATIENT MESSAGE (OUTPATIENT)
Dept: FAMILY MEDICINE CLINIC | Facility: CLINIC | Age: 60
End: 2025-01-04
Payer: COMMERCIAL

## 2025-01-07 NOTE — TELEPHONE ENCOUNTER
Rx Refill Note  Requested Prescriptions     Pending Prescriptions Disp Refills    Semaglutide-Weight Management (Wegovy) 1 MG/0.5ML solution auto-injector 2 mL 0     Sig: Inject 0.5 mL under the skin into the appropriate area as directed 1 (One) Time Per Week.      Last office visit with prescribing clinician: 10/24/2024   Last telemedicine visit with prescribing clinician: 12/6/2024   Next office visit with prescribing clinician: 1/24/2025                         Would you like a call back once the refill request has been completed: [] Yes [] No    If the office needs to give you a call back, can they leave a voicemail: [] Yes [] No    Yahaira Luu LPN  01/07/25, 13:45 EST

## 2025-01-08 RX ORDER — SEMAGLUTIDE 1 MG/.5ML
1 INJECTION, SOLUTION SUBCUTANEOUS WEEKLY
Qty: 2 ML | Refills: 0 | Status: SHIPPED | OUTPATIENT
Start: 2025-01-08

## 2025-01-15 RX ORDER — PANTOPRAZOLE SODIUM 40 MG/1
40 TABLET, DELAYED RELEASE ORAL DAILY
Qty: 90 TABLET | Refills: 3 | Status: CANCELLED | OUTPATIENT
Start: 2025-01-15

## 2025-01-15 RX ORDER — SPIRONOLACTONE AND HYDROCHLOROTHIAZIDE 25; 25 MG/1; MG/1
1 TABLET ORAL DAILY
Qty: 90 TABLET | Refills: 0 | Status: CANCELLED | OUTPATIENT
Start: 2025-01-15

## 2025-01-15 NOTE — TELEPHONE ENCOUNTER
Rx Refill Note  Requested Prescriptions     Pending Prescriptions Disp Refills    ezetimibe (ZETIA) 10 MG tablet 90 tablet 1     Sig: Take 1 tablet by mouth Daily.    rosuvastatin (Crestor) 5 MG tablet 90 tablet 1     Sig: Take 1 tablet by mouth Daily.    spironolactone-hydrochlorothiazide (ALDACTAZIDE) 25-25 MG tablet 90 tablet 1     Sig: Take 1 tablet by mouth Daily.      Last office visit with prescribing clinician: 10/24/2024   Last telemedicine visit with prescribing clinician: 12/6/2024   Next office visit with prescribing clinician: 1/24/2025                         Would you like a call back once the refill request has been completed: [] Yes [] No    If the office needs to give you a call back, can they leave a voicemail: [] Yes [] No    Julian Powell MA  01/15/25, 10:24 EST

## 2025-01-17 RX ORDER — SPIRONOLACTONE AND HYDROCHLOROTHIAZIDE 25; 25 MG/1; MG/1
1 TABLET ORAL DAILY
Qty: 90 TABLET | Refills: 1 | Status: SHIPPED | OUTPATIENT
Start: 2025-01-17

## 2025-01-17 RX ORDER — EZETIMIBE 10 MG/1
10 TABLET ORAL DAILY
Qty: 90 TABLET | Refills: 1 | Status: SHIPPED | OUTPATIENT
Start: 2025-01-17

## 2025-01-17 RX ORDER — ROSUVASTATIN CALCIUM 5 MG/1
5 TABLET, COATED ORAL DAILY
Qty: 90 TABLET | Refills: 1 | Status: SHIPPED | OUTPATIENT
Start: 2025-01-17

## 2025-01-21 ENCOUNTER — LAB (OUTPATIENT)
Dept: LAB | Facility: HOSPITAL | Age: 60
End: 2025-01-21
Payer: COMMERCIAL

## 2025-01-21 DIAGNOSIS — E78.00 PURE HYPERCHOLESTEROLEMIA: ICD-10-CM

## 2025-01-21 DIAGNOSIS — R73.09 ELEVATED GLUCOSE: ICD-10-CM

## 2025-01-21 DIAGNOSIS — Z00.00 ROUTINE GENERAL MEDICAL EXAMINATION AT A HEALTH CARE FACILITY: ICD-10-CM

## 2025-01-21 DIAGNOSIS — Z79.899 MEDICATION MANAGEMENT: ICD-10-CM

## 2025-01-21 LAB
ALBUMIN SERPL-MCNC: 4.2 G/DL (ref 3.5–5.2)
ALBUMIN/GLOB SERPL: 1.4 G/DL
ALP SERPL-CCNC: 73 U/L (ref 39–117)
ALT SERPL W P-5'-P-CCNC: 36 U/L (ref 1–33)
ANION GAP SERPL CALCULATED.3IONS-SCNC: 12 MMOL/L (ref 5–15)
AST SERPL-CCNC: 25 U/L (ref 1–32)
BILIRUB SERPL-MCNC: 0.6 MG/DL (ref 0–1.2)
BUN SERPL-MCNC: 13 MG/DL (ref 6–20)
BUN/CREAT SERPL: 14.4 (ref 7–25)
CALCIUM SPEC-SCNC: 9.5 MG/DL (ref 8.6–10.5)
CHLORIDE SERPL-SCNC: 99 MMOL/L (ref 98–107)
CHOLEST SERPL-MCNC: 149 MG/DL (ref 0–200)
CO2 SERPL-SCNC: 27 MMOL/L (ref 22–29)
CREAT SERPL-MCNC: 0.9 MG/DL (ref 0.57–1)
EGFRCR SERPLBLD CKD-EPI 2021: 73.8 ML/MIN/1.73
GLOBULIN UR ELPH-MCNC: 2.9 GM/DL
GLUCOSE SERPL-MCNC: 94 MG/DL (ref 65–99)
HBA1C MFR BLD: 5.2 % (ref 4.8–5.6)
HDLC SERPL-MCNC: 54 MG/DL (ref 40–60)
LDLC SERPL CALC-MCNC: 67 MG/DL (ref 0–100)
LDLC/HDLC SERPL: 1.13 {RATIO}
POTASSIUM SERPL-SCNC: 3.9 MMOL/L (ref 3.5–5.2)
PROT SERPL-MCNC: 7.1 G/DL (ref 6–8.5)
SODIUM SERPL-SCNC: 138 MMOL/L (ref 136–145)
TRIGL SERPL-MCNC: 169 MG/DL (ref 0–150)
VLDLC SERPL-MCNC: 28 MG/DL (ref 5–40)

## 2025-01-21 PROCEDURE — 80053 COMPREHEN METABOLIC PANEL: CPT

## 2025-01-21 PROCEDURE — 36415 COLL VENOUS BLD VENIPUNCTURE: CPT

## 2025-01-21 PROCEDURE — 83036 HEMOGLOBIN GLYCOSYLATED A1C: CPT

## 2025-01-21 PROCEDURE — 80061 LIPID PANEL: CPT

## 2025-01-24 ENCOUNTER — OFFICE VISIT (OUTPATIENT)
Dept: FAMILY MEDICINE CLINIC | Facility: CLINIC | Age: 60
End: 2025-01-24
Payer: COMMERCIAL

## 2025-01-24 VITALS
SYSTOLIC BLOOD PRESSURE: 126 MMHG | HEART RATE: 73 BPM | TEMPERATURE: 98 F | WEIGHT: 259.1 LBS | OXYGEN SATURATION: 100 % | DIASTOLIC BLOOD PRESSURE: 84 MMHG | BODY MASS INDEX: 45.91 KG/M2 | HEIGHT: 63 IN

## 2025-01-24 DIAGNOSIS — R74.8 ELEVATED LIVER ENZYMES: ICD-10-CM

## 2025-01-24 DIAGNOSIS — K21.9 GASTROESOPHAGEAL REFLUX DISEASE, UNSPECIFIED WHETHER ESOPHAGITIS PRESENT: Chronic | ICD-10-CM

## 2025-01-24 DIAGNOSIS — E66.01 MORBID OBESITY WITH BMI OF 40.0-44.9, ADULT: Chronic | ICD-10-CM

## 2025-01-24 DIAGNOSIS — E78.2 ELEVATED CHOLESTEROL WITH ELEVATED TRIGLYCERIDES: ICD-10-CM

## 2025-01-24 DIAGNOSIS — Z76.89 ENCOUNTER FOR WEIGHT MANAGEMENT: Primary | ICD-10-CM

## 2025-01-24 DIAGNOSIS — F33.1 MODERATE EPISODE OF RECURRENT MAJOR DEPRESSIVE DISORDER: Chronic | ICD-10-CM

## 2025-01-24 DIAGNOSIS — F41.1 GENERALIZED ANXIETY DISORDER: Chronic | ICD-10-CM

## 2025-01-24 PROCEDURE — 99214 OFFICE O/P EST MOD 30 MIN: CPT | Performed by: NURSE PRACTITIONER

## 2025-01-24 RX ORDER — PANTOPRAZOLE SODIUM 40 MG/1
40 TABLET, DELAYED RELEASE ORAL DAILY
Qty: 90 TABLET | Refills: 1 | Status: SHIPPED | OUTPATIENT
Start: 2025-01-24

## 2025-01-24 NOTE — PROGRESS NOTES
Chief Complaint  Weight Loss    Subjective        Lizy Bryan presents to [facility]  History of Present Illness    History of Present Illness  The patient presents for weight management, elevated triglycerides, fatigue, and medication management.    She reports a recent weight gain, which she attributes to her consumption of Pepsi, a habit she resumed around [place] but has discontinued in the past few days. She does not report any adverse effects from the use of semaglutide. She experiences hunger at specific times of the day, with an episode of intense hunger last week that did not result in food consumption. She has eliminated carbohydrates from her diet and has not consumed Pepsi for several days. She does not report any symptoms of depression. Her daily routine involves working from home, which she enjoys, and she spends most of her day seated at her desk. She does not experience stress related to her work environment. She does not report any gastrointestinal symptoms such as nausea, vomiting, diarrhea, or constipation. She also reports feeling winded during walks and experiencing hip pain. Her daily step count is estimated to be less than 100. She has been on a regimen of semaglutide 100 mg since the previous Friday without any reported side effects. She has found semaglutide to be effective in suppressing her appetite. She has incorporated a high-protein diet into her lifestyle, including jerky sticks and eggs, but does not consume large quantities of meat.    She expresses concern over a slight elevation in her liver enzymes.    She does not report any daytime drowsiness associated with trazodone use. She infrequently uses trazodone, approximately less than once a month, and only half a dose when needed. She used it more frequently following the loss of her  but has since reduced its use. She prefers to avoid medications that induce sleepiness. She believes the  "current dosage of Prozac is appropriate.    She does not experience heartburn and requires refills of pantoprazole.    She has observed a decrease in her cholesterol levels. She continues to take Prim oil in conjunction with a low-dose statin and is uncertain if the combination or the statin alone has contributed to her improved lab results.    MEDICATIONS  Current       Objective   Vital Signs:  /84 (BP Location: Left arm, Patient Position: Sitting, Cuff Size: Large Adult)   Pulse 73   Temp 98 °F (36.7 °C) (Temporal)   Ht 160 cm (63\")   Wt 118 kg (259 lb 1.6 oz)   SpO2 100%   BMI 45.90 kg/m²   Estimated body mass index is 45.9 kg/m² as calculated from the following:    Height as of this encounter: 160 cm (63\").    Weight as of this encounter: 118 kg (259 lb 1.6 oz).               Physical Exam  Vitals and nursing note reviewed.   Constitutional:       General: She is not in acute distress.     Appearance: She is well-developed. She is not ill-appearing or diaphoretic.   HENT:      Head: Normocephalic and atraumatic.   Eyes:      General:         Right eye: No discharge.         Left eye: No discharge.      Conjunctiva/sclera: Conjunctivae normal.   Cardiovascular:      Rate and Rhythm: Normal rate and regular rhythm.   Pulmonary:      Effort: Pulmonary effort is normal.      Breath sounds: Normal breath sounds.   Abdominal:      General: Bowel sounds are normal. There is no distension.      Palpations: Abdomen is soft.      Tenderness: There is no abdominal tenderness.   Musculoskeletal:         General: No deformity.      Comments: Gait smooth and steady   Skin:     General: Skin is warm and dry.   Neurological:      General: No focal deficit present.      Mental Status: She is alert and oriented to person, place, and time.   Psychiatric:         Mood and Affect: Mood normal.         Behavior: Behavior normal.          Physical Exam  Vital Signs  Blood pressure is normal.     Result Review " :            Results  Laboratory Studies  Total cholesterol is 149. Triglycerides are slightly elevated. LDL is 67. A1c has dropped 0.3 from 4.9. Glucose levels are normal. Kidney function is normal. Electrolytes are normal. One liver enzyme is slightly elevated.                Assessment and Plan     Diagnoses and all orders for this visit:    1. Encounter for weight management (Primary)    2. Morbid obesity with BMI of 40.0-44.9, adult    3. Elevated liver enzymes    4. Elevated cholesterol with elevated triglycerides    5. Gastroesophageal reflux disease, unspecified whether esophagitis present  -     pantoprazole (PROTONIX) 40 MG EC tablet; Take 1 tablet by mouth Daily.  Dispense: 90 tablet; Refill: 1    6. Moderate episode of recurrent major depressive disorder    7. Generalized anxiety disorder        Assessment & Plan  1. Weight management for obesity with BMI 44.29  Weight is up but some of this may be due to holidays.  She has improved A1c and lipids.  We will continue the semaglutide escalations as tolerated.  Diet and exercise discussed.  No refill of semaglutide needed today.    She has successfully reduced her total cholesterol by over 100 points within a span of 7 months. Her A1c has decreased by 0.3, which is commendable given that it was 4.9 a year ago. Her glucose levels, kidney function, and electrolytes are all within normal ranges.  Blood pressure readings are within the normal range.  She is encouraged to aim for a daily step count of 5000, with a gradual increase over time.    2. Elevated triglycerides.   She is advised to continue avoiding sugary drinks and monitor her carbohydrate intake. It is anticipated that her triglyceride levels will improve with these changes.  No statin refill needed today.    3. Elevated liver enzymes.  One of her liver enzymes is slightly elevated, possibly due to the increased triglycerides. It is anticipated to decrease in subsequent tests.  Will monitor.    4.   GERD-controlled  A prescription for pantoprazole has been sent to the community pharmacy.     5.  MDD-controlled   she is advised to continue her current dose of Prozac.  No refill needed today.            Follow Up     No follow-ups on file.  Patient was given instructions and counseling regarding her condition or for health maintenance advice. Please see specific information pulled into the AVS if appropriate.    Patient or patient representative verbalized consent for the use of Ambient Listening during the visit with  NORA Felix for chart documentation. 2/20/2025  06:29 EST      Answers submitted by the patient for this visit:  Primary Reason for Visit (Submitted on 1/17/2025)  What is the primary reason for your visit?: Problem Not Listed

## 2025-02-02 ENCOUNTER — PATIENT MESSAGE (OUTPATIENT)
Dept: FAMILY MEDICINE CLINIC | Facility: CLINIC | Age: 60
End: 2025-02-02
Payer: COMMERCIAL

## 2025-02-03 RX ORDER — SEMAGLUTIDE 1.7 MG/.75ML
1.7 INJECTION, SOLUTION SUBCUTANEOUS WEEKLY
Qty: 3 ML | Refills: 0 | Status: SHIPPED | OUTPATIENT
Start: 2025-02-03

## 2025-02-03 NOTE — TELEPHONE ENCOUNTER
Rx Refill Note  Requested Prescriptions     Pending Prescriptions Disp Refills    Semaglutide-Weight Management (Wegovy) 1.7 MG/0.75ML solution auto-injector 3 mL 0     Sig: Inject 0.75 mL under the skin into the appropriate area as directed 1 (One) Time Per Week. Ok to compound      Last office visit with prescribing clinician: 1/24/2025   Last telemedicine visit with prescribing clinician: 12/6/2024   Next office visit with prescribing clinician: 5/2/2025                         Would you like a call back once the refill request has been completed: [] Yes [] No    If the office needs to give you a call back, can they leave a voicemail: [] Yes [] No    Yahaira Luu LPN  02/03/25, 08:37 EST

## 2025-02-10 ENCOUNTER — DOCUMENTATION (OUTPATIENT)
Dept: SLEEP MEDICINE | Facility: HOSPITAL | Age: 60
End: 2025-02-10
Payer: COMMERCIAL

## 2025-02-10 ENCOUNTER — OFFICE VISIT (OUTPATIENT)
Facility: HOSPITAL | Age: 60
End: 2025-02-10
Payer: COMMERCIAL

## 2025-02-10 VITALS
HEIGHT: 64 IN | WEIGHT: 258 LBS | DIASTOLIC BLOOD PRESSURE: 77 MMHG | SYSTOLIC BLOOD PRESSURE: 120 MMHG | HEART RATE: 76 BPM | BODY MASS INDEX: 44.05 KG/M2 | OXYGEN SATURATION: 94 %

## 2025-02-10 DIAGNOSIS — E66.813 CLASS 3 SEVERE OBESITY DUE TO EXCESS CALORIES WITHOUT SERIOUS COMORBIDITY WITH BODY MASS INDEX (BMI) OF 40.0 TO 44.9 IN ADULT: ICD-10-CM

## 2025-02-10 DIAGNOSIS — G47.33 OSA (OBSTRUCTIVE SLEEP APNEA): ICD-10-CM

## 2025-02-10 DIAGNOSIS — E66.01 CLASS 3 SEVERE OBESITY DUE TO EXCESS CALORIES WITHOUT SERIOUS COMORBIDITY WITH BODY MASS INDEX (BMI) OF 40.0 TO 44.9 IN ADULT: ICD-10-CM

## 2025-02-10 DIAGNOSIS — G47.33 OSA ON CPAP: Primary | ICD-10-CM

## 2025-02-10 PROCEDURE — G0463 HOSPITAL OUTPT CLINIC VISIT: HCPCS

## 2025-02-10 NOTE — PROGRESS NOTES
[facility]  Sleep Medicine   [address]  [address]  [place] [address]  Phone   Fax       Lizy Bryan  [id number]   1965  59 y.o.  female      PCP:Magali Obrien APRN    Type of service: Initial New Patient Office Visit  Date of service: 2/10/2025    Chief Complaint   Patient presents with    Sleep Apnea    Obesity       History of present illness;  Lizy Bryan 59 y.o.  is a new patient for me and was seen today for management of obstructive sleep apnea.  Previously she was tested through her primary care physician's office.  The test was done by [facility] and needs a home sleep test which was scored on 4% desaturation for hypopnea using 1B rule.  I have reviewed the study.  Study was conducted on 28 August year 2019.  Study shows moderate sleep apnea with AHI of 19/h.    Patient reports that she using the CPAP on a regular basis but she has Ivon CPAP machine and wants to get a new ResMed CPAP.  Moreover she is also buying supplies from [employer].  At present she is working for [employer] works at the [employer].    Patient gives the following sleep history.  Sleep schedule:  Bedtime: 11 PM  Wake time: 7 a.m.  Normally takes about 15 minutes to fall asleep  Average hours of sleep 8-9  Number of naps per day 1      Past medical history: (Relevant to sleep medicine)  Sleep apnea  Hypertension  Anxiety and depression    Medications are reviewed by me and documented in the encounter  Allergies reviewed and documented in encounter    Social history:  Do you drive a commercial vehicle:  No   Shift work:  No   Tobacco use:  No   Alcohol use:  0 per week  Caffeinated drinks: 1    FAMILY HISTORY (Your mother, father, brothers and sisters) (relevant to sleep medicine)  Sleep apnea Brother and sister    REVIEW OF SYSTEMS.  Full review of systems available on the intake form which is scanned in the media tab.  The relevant positive are noted  "below  Daytime excessive sleepiness with [place] Sleepiness Scale :Total score: 3   Snoring resolved with the sleep      Physical exam:  Vitals:    02/10/25 0900   BP: 120/77   Pulse: 76   SpO2: 94%   Weight: 117 kg (258 lb)   Height: 162.6 cm (64\")    Body mass index is 44.29 kg/m². Neck Circumference: 16.5 inches  Nose: no nasal septal defects or deviation and the nasal passages are clear, no nasal polyps,  Throat: tonsils are not in, tongue normal, oral airway Mallampati class 3  NECK:Neck Circumference: 16.5 inches, trachea is in the midline, thyroid not enlarged  RESPIRATORY SYSTEM: Breath sounds are equal on both sides, there are no wheezes   CARDIOVASULAR SYSTEM: Heart sounds are regular rhythm and darlin rate, no edema  EXTREMITES: No cyanosis, clubbing  NEUROLOGICAL SYSTEM: Oriented x 3, no gross motor defects, gait normal    Labs reviewed.  TSH Results:     Most Recent A1C          1/21/2025    07:49   HGBA1C Most Recent   Hemoglobin A1C 5.20            Assessment and plan:  Obstructive sleep apnea.  G47.33.  She has moderate sleep apnea I have reviewed her sleep study and she is eligible for a new CPAP.  Her compliance also is excellent with a old CPAP.  I have sent an order to [facility] to get a new auto CPAP ResMed and come back and see me in 31 to 90 days for follow-up.  The CPAP is medically necessary and it is benefiting the patient  Snoring (R06.83) snoring is the sound created by turbulent airflow vibrating upper airway soft tissue.  I have also discussed factors affecting snoring including sleep deprivation, sleeping on the back and alcohol ingestion. To minimize snoring, patient is advised to have adequate sleep, sleep on the side and avoid alcohol and sedative medications before bedtime  Obesity 3, with BMI Body mass index is 44.29 kg/m².. I have discussed the relationship between weight and sleep apnea.There is direct correlation between weight and severity of sleep apnea.  Weight " reduction is encouraged, as it is going to reduce the severity of sleep apnea. I have also discussed with the patient diet and exercise to achieve ideal body weight   Hypertension,   Return for 31 to 90 days after PAP setup with down load..  Patient's questions were answered.      2/10/2025  Jessie Raza MD  Sleep Medicine  Medical Director  [facility]: [facility] sleep [facility]

## 2025-02-20 PROBLEM — F41.1 GENERALIZED ANXIETY DISORDER: Status: ACTIVE | Noted: 2025-02-20

## 2025-02-20 PROBLEM — F33.1 MODERATE EPISODE OF RECURRENT MAJOR DEPRESSIVE DISORDER: Status: ACTIVE | Noted: 2025-02-20

## 2025-02-20 PROBLEM — E78.2 ELEVATED CHOLESTEROL WITH ELEVATED TRIGLYCERIDES: Status: ACTIVE | Noted: 2025-02-20

## 2025-02-26 ENCOUNTER — PATIENT MESSAGE (OUTPATIENT)
Dept: FAMILY MEDICINE CLINIC | Facility: CLINIC | Age: 60
End: 2025-02-26
Payer: COMMERCIAL

## 2025-02-27 NOTE — TELEPHONE ENCOUNTER
Rx Refill Note  Requested Prescriptions     Pending Prescriptions Disp Refills    Semaglutide-Weight Management 2.4 MG/0.75ML solution auto-injector 3 mL 0     Sig: Inject 0.75 mL under the skin into the appropriate area as directed 1 (One) Time Per Week.      Last office visit with prescribing clinician: 1/24/2025   Last telemedicine visit with prescribing clinician: 12/6/2024   Next office visit with prescribing clinician: 5/12/2025                         Would you like a call back once the refill request has been completed: [] Yes [] No    If the office needs to give you a call back, can they leave a voicemail: [] Yes [] No    Yahaira Luu LPN  02/27/25, 07:54 EST

## 2025-03-28 NOTE — TELEPHONE ENCOUNTER
"  Caller: Lizy Bryan    Relationship: Self    Best call back number:     [phone] (Mobile)       Requested Prescriptions:   Requested Prescriptions     Pending Prescriptions Disp Refills    Semaglutide-Weight Management 2.4 MG/0.75ML solution auto-injector 3 mL 0     Sig: Inject 0.75 mL under the skin into the appropriate area as directed 1 (One) Time Per Week.        Pharmacy where request should be sent: [facility] \"COMPOUNDS ONLY\" - [facility] [address] [phone] [place] [phone]      Last office visit with prescribing clinician: 1/24/2025   Last telemedicine visit with prescribing clinician: 12/6/2024   Next office visit with prescribing clinician: 4/16/2025     Additional details provided by patient:     Does the patient have less than a 3 day supply:  [] Yes  [x] No    Would you like a call back once the refill request has been completed: [] Yes [] No    If the office needs to give you a call back, can they leave a voicemail: [] Yes [] No    Mercedes Heredia Rep   03/28/25 13:56 EDT         "

## 2025-03-28 NOTE — TELEPHONE ENCOUNTER
Rx Refill Note  Requested Prescriptions     Pending Prescriptions Disp Refills    Semaglutide-Weight Management 2.4 MG/0.75ML solution auto-injector 3 mL 0     Sig: Inject 0.75 mL under the skin into the appropriate area as directed 1 (One) Time Per Week.      Last office visit with prescribing clinician: 1/24/2025   Last telemedicine visit with prescribing clinician: 12/6/2024   Next office visit with prescribing clinician: 4/16/2025                         Would you like a call back once the refill request has been completed: [] Yes [] No    If the office needs to give you a call back, can they leave a voicemail: [] Yes [] No    Loly Hernández MA  03/28/25, 13:58 EDT

## 2025-04-16 ENCOUNTER — TELEMEDICINE (OUTPATIENT)
Dept: FAMILY MEDICINE CLINIC | Facility: CLINIC | Age: 60
End: 2025-04-16
Payer: COMMERCIAL

## 2025-04-16 DIAGNOSIS — G47.33 MODERATE OBSTRUCTIVE SLEEP APNEA: Primary | Chronic | ICD-10-CM

## 2025-04-16 DIAGNOSIS — E66.01 MORBID OBESITY WITH BMI OF 40.0-44.9, ADULT: Chronic | ICD-10-CM

## 2025-04-16 PROCEDURE — 99213 OFFICE O/P EST LOW 20 MIN: CPT | Performed by: NURSE PRACTITIONER

## 2025-04-16 NOTE — PROGRESS NOTES
"Chief Complaint  Encounter for weight management    Subjective        Lizy Bryan presents to [facility]  History of Present Illness    History of Present Illness  Patient scheduled a video visit to discuss weight management.  She has been on compounded semaglutide and has been at the max dose for 2 months and has not lost any weight.  She previously felt like the Wegovy brand pens did help her to lose weight and she was able to get under 200 pounds.  She has no real appetite suppression and no side effects on compounded semaglutide and is interested in switching to tirzepatide.  She does have moderate ADDIE and religiously uses her CPAP.       Objective   Vital Signs:  There were no vitals taken for this visit.  Estimated body mass index is 44.29 kg/m² as calculated from the following:    Height as of 2/10/25: 162.6 cm (64\").    Weight as of 2/10/25: 117 kg (258 lb).               Physical Exam     Physical Exam   Limited by video visit.  She is well appearing and does not seem to be distressed.  She seems alert and oriented and her mood and affect are normal, good historian of medical history.  No cough or dyspnea appreciated, able to complete sentences without problem.       Result Review :            Results                  Assessment and Plan     Diagnoses and all orders for this visit:    1. Moderate obstructive sleep apnea (Primary)  -     Tirzepatide-Weight Management (ZEPBOUND) 5 MG/0.5ML solution auto-injector; Inject 0.5 mL under the skin into the appropriate area as directed 1 (One) Time Per Week.  Dispense: 2 mL; Refill: 0    2. Morbid obesity with BMI of 40.0-44.9, adult  -     Semaglutide-Weight Management 2.4 MG/0.75ML solution auto-injector; Inject 0.75 mL under the skin into the appropriate area as directed 1 (One) Time Per Week.  Dispense: 3 mL; Refill: 0  -     Tirzepatide-Weight Management (ZEPBOUND) 5 MG/0.5ML solution auto-injector; Inject 0.5 mL under the " skin into the appropriate area as directed 1 (One) Time Per Week.  Dispense: 2 mL; Refill: 0        Assessment & Plan  Patient will no longer be able to get compounded semaglutide after April 22.  I will give her one last prescription for compounded semaglutide.  We discussed other options for compounded medication.  However patient does have moderate ADDIE and obesity and there is an indication for tirzepatide for this.  Patient would like to try tirzepatide.  Since she has been on semaglutide max dose for 2 months and likely closer to 3 by the time a PA is completed, we will start at the 5 mg dose and have her follow-up with me after 3 weeks, sooner for any side effects.  We discussed side effects, diet, exercise.  She is trying to increase her activity and get more steps in.  Yesterday she got 3000 and is trying to get 5000 today and increase to 10,000.  When she started working from home she has noticed pretty significant decrease in activity and is trying to address that.  She feels like she overeats due to appetite and is not able to control amount and frequency of intake.  She will let me know if her PA goes through so that we can get her follow-up visit scheduled.  We did discuss other options such as Miranda Sheridan direct and Wegovy though the cost is prohibitive for patient.          I spent 25 minutes caring for Lizy on this date of service. This time includes time spent by me in the following activities:preparing for the visit, reviewing tests, performing a medically appropriate examination and/or evaluation , counseling and educating the patient/family/caregiver, ordering medications, tests, or procedures, and documenting information in the medical record  Follow Up     No follow-ups on file.  Patient was given instructions and counseling regarding her condition or for health maintenance advice. Please see specific information pulled into the AVS if appropriate.

## 2025-04-22 ENCOUNTER — PATIENT MESSAGE (OUTPATIENT)
Dept: FAMILY MEDICINE CLINIC | Facility: CLINIC | Age: 60
End: 2025-04-22
Payer: COMMERCIAL

## 2025-04-22 DIAGNOSIS — E66.01 MORBID OBESITY WITH BMI OF 40.0-44.9, ADULT: Chronic | ICD-10-CM

## 2025-04-22 DIAGNOSIS — G47.33 MODERATE OBSTRUCTIVE SLEEP APNEA: Chronic | ICD-10-CM

## 2025-05-12 ENCOUNTER — OFFICE VISIT (OUTPATIENT)
Facility: HOSPITAL | Age: 60
End: 2025-05-12
Payer: COMMERCIAL

## 2025-05-12 VITALS — OXYGEN SATURATION: 94 % | WEIGHT: 253 LBS | HEIGHT: 64 IN | HEART RATE: 74 BPM | BODY MASS INDEX: 43.19 KG/M2

## 2025-05-12 DIAGNOSIS — G47.33 OSA ON CPAP: Primary | ICD-10-CM

## 2025-05-12 DIAGNOSIS — E66.813 CLASS 3 SEVERE OBESITY DUE TO EXCESS CALORIES WITHOUT SERIOUS COMORBIDITY WITH BODY MASS INDEX (BMI) OF 40.0 TO 44.9 IN ADULT: ICD-10-CM

## 2025-05-12 DIAGNOSIS — E66.01 CLASS 3 SEVERE OBESITY DUE TO EXCESS CALORIES WITHOUT SERIOUS COMORBIDITY WITH BODY MASS INDEX (BMI) OF 40.0 TO 44.9 IN ADULT: ICD-10-CM

## 2025-05-12 PROCEDURE — 99213 OFFICE O/P EST LOW 20 MIN: CPT | Performed by: INTERNAL MEDICINE

## 2025-05-12 NOTE — PROGRESS NOTES
"  [facility]  Sleep Medicine   [address]  [address]  [place] [address]  Phone   Fax       SLEEP CLINIC FOLLOW UP PROGRESS NOTE.    Lizy Bryan  [id number]   1965  59 y.o.  female      PCP: Magali Obrien APRN      Date of visit: 5/12/2025    Chief Complaint   Patient presents with    Sleep Apnea    Obesity       HPI:  This is a 59 y.o. years old patient is here for the management of obstructive sleep apnea.  Sleep apnea is moderate in severity with a AHI of 19/hr. Patient is using positive airway pressure therapy with auto CPAP and the symptoms of sleep apnea have improved significantly on the therapy. Normally patient goes to bed at 11 PM and wakes up at 7 AM .  The patient wakes up 2-3 time(s) during the night and has no problem going back to sleep.  Feels refreshed after waking up.  She works for [employer] at the [employer]    Medications and allergies are reviewed by me and documented in the encounter.     SOCIAL (habits pertaining to sleep medicine)  History tobacco use:No   History of alcohol use: 0 per week  Caffeine use: 0     REVIEW OF SYSTEMS:   Pertaining positive symptoms are:  [place] Sleepiness Scale :Total score: 0       PHYSICAL EXAMINATION:  CONSTITUTIONAL:  Vitals:    05/12/25 0846   Pulse: 74   SpO2: 94%   Weight: 115 kg (253 lb)   Height: 162.6 cm (64\")    Body mass index is 43.43 kg/m².   NOSE: nasal passages are clear, No deformities noted   RESP SYSTEM: Not in any respiratory distress, no chest deformities noted,   CARDIOVASULAR: No edema noted  NEURO: Oriented x 3, gait normal,  Mood and affect appeared appropriate      Data reviewed:  The Smart card downloaded on 5/12/2025 has been reviewed independently by me for compliance and discussed the data with the patient.   Compliance; 100%  More than 4 hr use, 100%  Average use of the device 9 hours and 37 minutes per night  Residual AHI: 0.6 /hr (Optimal < 5/hr, Good <10/hr, Adequate reduce " by 75% from baseline)  Mask type: Nasal pillows DreamWear  Device: ResMed  DME: [facility]         ASSESSMENT AND PLAN:  Obstructive sleep apnea ( G 47.33).  The symptoms of sleep apnea have improved with the device and the treatment.  Patient's compliance with the device is excellent for treatment of sleep apnea.  I have independently reviewed the smart card down load and discussed with the patient the download data and encouarged the patient to continue to use the device.The residual AHI is acceptable. The device is benefiting the patient and the device is medically necessary.  Without proper control of sleep apnea and good compliance there is a increased risk for hypertension, diabetes mellitus and nonrestorative sleep with hypersomnia which can increase risk for motor vehicle accidents.  Untreated sleep apnea is also a risk factor for development of atrial fibrillation, pulmonary hypertension, insulin resistance and stroke. The patient is also instructed to get the supplies from the DME company and and change them on a regular basis.  A prescription for supplies has been sent to the DME company.  I have also discussed the good sleep hygiene habits and adequate amount of sleep needed for good health.  Obesity  3 with BMI is Body mass index is 43.43 kg/m².. I have discuss the relationship between the weight and sleep apnea. The benefit of weight loss in reducing severity of sleep apnea was discussed. Discussed diet and exercise with the patient to achieve ideal BMI.  Return for with smart card down load. . Patient's questions were answered.    5/12/2025  Jessie Raza MD  Sleep Medicine.  Medical Director,   [facility] sleep centers.

## 2025-05-20 ENCOUNTER — PATIENT MESSAGE (OUTPATIENT)
Dept: FAMILY MEDICINE CLINIC | Facility: CLINIC | Age: 60
End: 2025-05-20
Payer: COMMERCIAL

## 2025-05-20 DIAGNOSIS — E66.01 MORBID OBESITY WITH BMI OF 40.0-44.9, ADULT: Chronic | ICD-10-CM

## 2025-05-20 DIAGNOSIS — G47.33 MODERATE OBSTRUCTIVE SLEEP APNEA: Chronic | ICD-10-CM

## 2025-05-20 NOTE — TELEPHONE ENCOUNTER
Rx Refill Note  Requested Prescriptions     Pending Prescriptions Disp Refills    Tirzepatide-Weight Management (ZEPBOUND) 7.5 MG/0.5ML solution auto-injector 2 mL 0     Sig: Inject 0.5 mL under the skin into the appropriate area as directed 1 (One) Time Per Week.      Last office visit with prescribing clinician: 1/24/2025   Last telemedicine visit with prescribing clinician: 4/16/2025   Next office visit with prescribing clinician: 6/30/2025                         Would you like a call back once the refill request has been completed: [] Yes [] No    If the office needs to give you a call back, can they leave a voicemail: [] Yes [] No    Gamal Elkins MA  05/20/25, 12:57 EDT

## 2025-06-30 ENCOUNTER — LAB (OUTPATIENT)
Dept: LAB | Facility: HOSPITAL | Age: 60
End: 2025-06-30
Payer: COMMERCIAL

## 2025-06-30 ENCOUNTER — OFFICE VISIT (OUTPATIENT)
Dept: FAMILY MEDICINE CLINIC | Facility: CLINIC | Age: 60
End: 2025-06-30
Payer: COMMERCIAL

## 2025-06-30 VITALS
WEIGHT: 249.9 LBS | HEIGHT: 64 IN | TEMPERATURE: 97.7 F | BODY MASS INDEX: 42.66 KG/M2 | OXYGEN SATURATION: 99 % | DIASTOLIC BLOOD PRESSURE: 78 MMHG | SYSTOLIC BLOOD PRESSURE: 126 MMHG | HEART RATE: 65 BPM

## 2025-06-30 DIAGNOSIS — G47.33 MODERATE OBSTRUCTIVE SLEEP APNEA: ICD-10-CM

## 2025-06-30 DIAGNOSIS — E78.2 ELEVATED CHOLESTEROL WITH ELEVATED TRIGLYCERIDES: ICD-10-CM

## 2025-06-30 DIAGNOSIS — R00.2 PALPITATIONS: ICD-10-CM

## 2025-06-30 DIAGNOSIS — E66.813 CLASS 3 OBESITY WITH ALVEOLAR HYPOVENTILATION, SERIOUS COMORBIDITY, AND BODY MASS INDEX (BMI) OF 40.0 TO 44.9 IN ADULT: ICD-10-CM

## 2025-06-30 DIAGNOSIS — E66.2 CLASS 3 OBESITY WITH ALVEOLAR HYPOVENTILATION, SERIOUS COMORBIDITY, AND BODY MASS INDEX (BMI) OF 40.0 TO 44.9 IN ADULT: ICD-10-CM

## 2025-06-30 DIAGNOSIS — I10 PRIMARY HYPERTENSION: ICD-10-CM

## 2025-06-30 DIAGNOSIS — M79.10 MYALGIA: ICD-10-CM

## 2025-06-30 DIAGNOSIS — M79.10 MYALGIA: Primary | ICD-10-CM

## 2025-06-30 DIAGNOSIS — Z76.89 ENCOUNTER FOR WEIGHT MANAGEMENT: Primary | ICD-10-CM

## 2025-06-30 DIAGNOSIS — R74.8 ELEVATED LIVER ENZYMES: ICD-10-CM

## 2025-06-30 LAB
ALBUMIN SERPL-MCNC: 4.1 G/DL (ref 3.5–5.2)
ALBUMIN/GLOB SERPL: 2.2 G/DL
ALP SERPL-CCNC: 71 U/L (ref 39–117)
ALT SERPL W P-5'-P-CCNC: 27 U/L (ref 1–33)
ANION GAP SERPL CALCULATED.3IONS-SCNC: 8.4 MMOL/L (ref 5–15)
AST SERPL-CCNC: 22 U/L (ref 1–32)
BILIRUB SERPL-MCNC: 0.6 MG/DL (ref 0–1.2)
BUN SERPL-MCNC: 10 MG/DL (ref 6–20)
BUN/CREAT SERPL: 11.8 (ref 7–25)
CALCIUM SPEC-SCNC: 9.4 MG/DL (ref 8.6–10.5)
CHLORIDE SERPL-SCNC: 106 MMOL/L (ref 98–107)
CHOLEST SERPL-MCNC: 131 MG/DL (ref 0–200)
CK SERPL-CCNC: 97 U/L (ref 20–180)
CO2 SERPL-SCNC: 25.6 MMOL/L (ref 22–29)
CREAT SERPL-MCNC: 0.85 MG/DL (ref 0.57–1)
DEPRECATED RDW RBC AUTO: 42.8 FL (ref 37–54)
EGFRCR SERPLBLD CKD-EPI 2021: 79 ML/MIN/1.73
ERYTHROCYTE [DISTWIDTH] IN BLOOD BY AUTOMATED COUNT: 13 % (ref 12.3–15.4)
GLOBULIN UR ELPH-MCNC: 1.9 GM/DL
GLUCOSE SERPL-MCNC: 86 MG/DL (ref 65–99)
HBA1C MFR BLD: 5.2 % (ref 4.8–5.6)
HCT VFR BLD AUTO: 42.7 % (ref 34–46.6)
HDLC SERPL-MCNC: 48 MG/DL (ref 40–60)
HGB BLD-MCNC: 14.9 G/DL (ref 12–15.9)
LDLC SERPL CALC-MCNC: 66 MG/DL (ref 0–100)
LDLC/HDLC SERPL: 1.36 {RATIO}
MCH RBC QN AUTO: 31.5 PG (ref 26.6–33)
MCHC RBC AUTO-ENTMCNC: 34.9 G/DL (ref 31.5–35.7)
MCV RBC AUTO: 90.3 FL (ref 79–97)
PLATELET # BLD AUTO: 288 10*3/MM3 (ref 140–450)
PMV BLD AUTO: 10 FL (ref 6–12)
POTASSIUM SERPL-SCNC: 4.3 MMOL/L (ref 3.5–5.2)
PROT SERPL-MCNC: 6 G/DL (ref 6–8.5)
RBC # BLD AUTO: 4.73 10*6/MM3 (ref 3.77–5.28)
SODIUM SERPL-SCNC: 140 MMOL/L (ref 136–145)
TRIGL SERPL-MCNC: 89 MG/DL (ref 0–150)
TSH SERPL DL<=0.05 MIU/L-ACNC: 2.19 UIU/ML (ref 0.27–4.2)
VLDLC SERPL-MCNC: 17 MG/DL (ref 5–40)
WBC NRBC COR # BLD AUTO: 4.09 10*3/MM3 (ref 3.4–10.8)

## 2025-06-30 PROCEDURE — 93000 ELECTROCARDIOGRAM COMPLETE: CPT | Performed by: NURSE PRACTITIONER

## 2025-06-30 PROCEDURE — 80050 GENERAL HEALTH PANEL: CPT | Performed by: NURSE PRACTITIONER

## 2025-06-30 PROCEDURE — 99214 OFFICE O/P EST MOD 30 MIN: CPT | Performed by: NURSE PRACTITIONER

## 2025-06-30 PROCEDURE — 80061 LIPID PANEL: CPT

## 2025-06-30 PROCEDURE — 82550 ASSAY OF CK (CPK): CPT

## 2025-06-30 PROCEDURE — 36415 COLL VENOUS BLD VENIPUNCTURE: CPT

## 2025-06-30 PROCEDURE — 83036 HEMOGLOBIN GLYCOSYLATED A1C: CPT

## 2025-06-30 NOTE — PROGRESS NOTES
Chief Complaint  Weight Loss and Hyperlipidemia    Subjective        Lizy Bryan presents to [facility]  Weight Loss  Hyperlipidemia        History of Present Illness  The patient presents for weight management, heart palpitations, and anxiety.    She reports no current health concerns and is managing well on her medication regimen. She has completed her last dose of tirzepatide 7.5 mg and is considering an increase in dosage. She reports no side effects from the medication, although it has suppressed her appetite. She is uncertain about her weight loss progress but believes she has lost some weight. Since February, she has lost approximately 10 pounds. She attributes her weight issues to menopause. She does not experience constipation.    She experiences occasional palpitations, which she describes as a fluttering sensation, similar to a butterfly's wings. These episodes typically occur after meals, leading her to believe they may be related to digestion. The palpitations are sometimes accompanied by shortness of breath, prompting her to take deep breaths. She underwent an EKG and stress test in 2023 due to dizziness, both of which returned normal results. She also had a Holter monitor test, which revealed symptomatic isolated PACs, but no episodes of SVT or other abnormalities. She feels that her palpitations have become more frequent recently, possibly due to stress from her job.    She continues to take Prozac and does not require any refills at this time. She is also on a statin and krill oil, which have effectively lowered her cholesterol levels. She occasionally experiences leg pain from the hips down, which she suspects may be a side effect of the statin.    Had recent f/u with sleep medicine for ADDIE and no changes made to tx plan.   RANDY-7 Score: RANDY 7 Total Score: 3  PHQ-9 Total Score: 6        SOCIAL HISTORY  She works from home.       Objective   Vital Signs:  /78  "(BP Location: Right arm, Patient Position: Sitting, Cuff Size: Adult)   Pulse 65   Temp 97.7 °F (36.5 °C) (Temporal)   Ht 162.6 cm (64\")   Wt 113 kg (249 lb 14.4 oz)   SpO2 99%   BMI 42.90 kg/m²   Estimated body mass index is 42.9 kg/m² as calculated from the following:    Height as of this encounter: 162.6 cm (64\").    Weight as of this encounter: 113 kg (249 lb 14.4 oz).               Physical Exam  Vitals and nursing note reviewed.   Constitutional:       General: She is not in acute distress.     Appearance: She is well-developed. She is obese. She is not ill-appearing or diaphoretic.   HENT:      Head: Normocephalic and atraumatic.   Eyes:      General:         Right eye: No discharge.         Left eye: No discharge.      Conjunctiva/sclera: Conjunctivae normal.   Cardiovascular:      Rate and Rhythm: Normal rate and regular rhythm.      Heart sounds: Normal heart sounds.   Pulmonary:      Effort: Pulmonary effort is normal.      Breath sounds: Normal breath sounds.   Abdominal:      General: Bowel sounds are normal.      Palpations: Abdomen is soft.      Tenderness: There is no abdominal tenderness.   Musculoskeletal:         General: No deformity.      Comments: Gait smooth and steady   Skin:     General: Skin is warm and dry.   Neurological:      General: No focal deficit present.      Mental Status: She is alert and oriented to person, place, and time.   Psychiatric:         Mood and Affect: Mood normal.         Behavior: Behavior normal.          Physical Exam       Result Review :            Results  Diagnostic Testing   - EK, Normal   - Holter Monitor: Symptomatic isolated PACs, no recorded symptoms, 14 patient triggered episodes that did not correlate, and no episodes of SVT or other arrhythmia           ECG 12 Lead    Date/Time: 2025 8:10 AM  Performed by: Magali Obrien APRN    Authorized by: Magali Obrien APRN  Comparison: compared with previous ECG from 7/3/2023  Similar " to previous ECG  Comparison to previous ECG: Previous normal sinus rhythm with low voltage and T wave abnormality  Rhythm: sinus rhythm  Rate: normal  BPM: 64  Conduction: conduction normal  ST Segments: ST segments normal  QRS axis: normal  Other findings: T wave abnormality    Clinical impression: non-specific ECG            Assessment and Plan     Diagnoses and all orders for this visit:    1. Encounter for weight management (Primary)    2. Class 3 obesity with alveolar hypoventilation, serious comorbidity, and body mass index (BMI) of 40.0 to 44.9 in adult  -     Tirzepatide-Weight Management (ZEPBOUND) 10 MG/0.5ML solution auto-injector; Inject 0.5 mL under the skin into the appropriate area as directed 1 (One) Time Per Week.  Dispense: 2 mL; Refill: 2  -     CBC (No Diff)  -     Comprehensive Metabolic Panel  -     Hemoglobin A1c    3. Elevated cholesterol with elevated triglycerides  -     Lipid Panel With LDL / HDL Ratio    4. Elevated liver enzymes    5. Moderate obstructive sleep apnea    6. Primary hypertension  -     Microalbumin / Creatinine Urine Ratio - Urine, Clean Catch    7. Palpitations  -     TSH Rfx On Abnormal To Free T4  -     ECG 12 Lead  -     Holter Monitor - 72 Hour Up To 15 Days; Future    8. Myalgia  -     CK        Assessment & Plan  1. Weight management for obesity with BMI of 42.9  - Gradual weight loss noted, from 258 pounds in 02/2025 to 249 pounds currently.  - Slow and steady weight loss is considered beneficial.  - No significant side effects reported from current medication.  - Prescription for tirzepatide 10 mg will be provided.    2. Heart palpitations.  - Reports experiencing palpitations, described as a fluttering sensation, particularly after eating.  - Previous EKG and Holter monitor tests from 2023 showed symptomatic isolated PACs but no significant findings.  - Further investigation with an EKG conducted today does not show changes from previous.  - No episodes of SVT or  other significant arrhythmias recorded previously.  -will repeat holter    3. RANDY: chronic, controlled  - Continues to take Prozac with no need for refills at this time.  - Reports anxiety related to life events and work stress.  - No additional changes to anxiety management plan discussed.    4. Cholesterol management.  - Currently taking a statin and Krill oil for cholesterol management.  - Reports occasional leg aches from the hips down, which may be related to the statin.  - Blood work will be ordered to check cholesterol levels and assess if the statin is causing the leg aches.  - Discussion about the potential impact of diet and menopause on cholesterol levels.            Follow Up     No follow-ups on file.  Patient was given instructions and counseling regarding her condition or for health maintenance advice. Please see specific information pulled into the AVS if appropriate.    Patient or patient representative verbalized consent for the use of Ambient Listening during the visit with  NORA Felix for chart documentation. 7/14/2025  13:10 EDT

## 2025-07-07 DIAGNOSIS — F33.1 MODERATE EPISODE OF RECURRENT MAJOR DEPRESSIVE DISORDER: ICD-10-CM

## 2025-07-07 DIAGNOSIS — F41.1 GENERALIZED ANXIETY DISORDER: ICD-10-CM

## 2025-07-07 NOTE — TELEPHONE ENCOUNTER
Rx Refill Note  Requested Prescriptions     Pending Prescriptions Disp Refills    FLUoxetine (PROzac) 20 MG capsule 90 capsule 1     Sig: Take 1 capsule by mouth Daily.      Last office visit with prescribing clinician: 6/30/2025   Last telemedicine visit with prescribing clinician: 4/16/2025   Next office visit with prescribing clinician: Visit date not found                         Would you like a call back once the refill request has been completed: [] Yes [] No    If the office needs to give you a call back, can they leave a voicemail: [] Yes [] No    Mercedes Steward Rep  07/07/25, 14:05 EDT

## 2025-07-15 NOTE — TELEPHONE ENCOUNTER
Rx Refill Note  Requested Prescriptions     Pending Prescriptions Disp Refills    spironolactone-hydrochlorothiazide (ALDACTAZIDE) 25-25 MG tablet 90 tablet 1     Sig: Take 1 tablet by mouth Daily.      Last office visit with prescribing clinician: 6/30/2025   Last telemedicine visit with prescribing clinician: 4/16/2025   Next office visit with prescribing clinician: Visit date not found                         Would you like a call back once the refill request has been completed: [] Yes [] No    If the office needs to give you a call back, can they leave a voicemail: [] Yes [] No    Mercedes Steward Rep  07/15/25, 16:25 EDT

## 2025-07-16 RX ORDER — SPIRONOLACTONE AND HYDROCHLOROTHIAZIDE 25; 25 MG/1; MG/1
1 TABLET ORAL DAILY
Qty: 90 TABLET | Refills: 1 | Status: SHIPPED | OUTPATIENT
Start: 2025-07-16

## 2025-07-29 ENCOUNTER — TELEPHONE (OUTPATIENT)
Dept: FAMILY MEDICINE CLINIC | Facility: CLINIC | Age: 60
End: 2025-07-29

## 2025-07-31 RX ORDER — TIRZEPATIDE 12.5 MG/.5ML
12.5 INJECTION, SOLUTION SUBCUTANEOUS WEEKLY
Qty: 2 ML | Refills: 2 | Status: SHIPPED | OUTPATIENT
Start: 2025-07-31

## 2025-08-26 DIAGNOSIS — K21.9 GASTROESOPHAGEAL REFLUX DISEASE, UNSPECIFIED WHETHER ESOPHAGITIS PRESENT: Chronic | ICD-10-CM

## 2025-08-26 RX ORDER — PANTOPRAZOLE SODIUM 40 MG/1
40 TABLET, DELAYED RELEASE ORAL DAILY
Qty: 90 TABLET | Refills: 1 | Status: SHIPPED | OUTPATIENT
Start: 2025-08-26

## (undated) DEVICE — MSK ENDO PORT O2 POM ELITE CURAPLEX A/

## (undated) DEVICE — BITEBLOCK OMNI BLOC

## (undated) DEVICE — GOWN ISOL W/THUMB UNIV BLU BX/15

## (undated) DEVICE — Device

## (undated) DEVICE — CANN NASL CO2 TRULINK W/O2 A/

## (undated) DEVICE — KT ORCA ORCAPOD DISP STRL

## (undated) DEVICE — GOWN ,SIRUS,NONREINFORCED 3XL: Brand: MEDLINE

## (undated) DEVICE — SINGLE-USE BIOPSY FORCEPS: Brand: RADIAL JAW 4

## (undated) DEVICE — VIAL FORMLN CAP 10PCT 20ML

## (undated) DEVICE — FLEX ADVANTAGE 1500CC: Brand: FLEX ADVANTAGE

## (undated) DEVICE — DEV INFL ALLIANCE2 SYS

## (undated) DEVICE — ESOPHAGEAL BALLOON DILATATION CATHETER: Brand: CRE FIXED WIRE